# Patient Record
Sex: MALE | Race: WHITE | Employment: OTHER | URBAN - METROPOLITAN AREA
[De-identification: names, ages, dates, MRNs, and addresses within clinical notes are randomized per-mention and may not be internally consistent; named-entity substitution may affect disease eponyms.]

---

## 2017-02-08 ENCOUNTER — ANESTHESIA EVENT (OUTPATIENT)
Dept: ENDOSCOPY | Age: 82
End: 2017-02-08
Payer: MEDICARE

## 2017-02-08 RX ORDER — CHOLECALCIFEROL (VITAMIN D3) 125 MCG
100 CAPSULE ORAL DAILY
Status: ON HOLD | COMMUNITY
End: 2021-03-22

## 2017-02-08 RX ORDER — MELATONIN
2000 DAILY
Status: ON HOLD | COMMUNITY
End: 2021-03-22

## 2017-02-08 RX ORDER — BISMUTH SUBSALICYLATE 262 MG
1 TABLET,CHEWABLE ORAL DAILY
COMMUNITY

## 2017-02-09 ENCOUNTER — ANESTHESIA (OUTPATIENT)
Dept: ENDOSCOPY | Age: 82
End: 2017-02-09
Payer: MEDICARE

## 2017-02-09 ENCOUNTER — HOSPITAL ENCOUNTER (OUTPATIENT)
Age: 82
Setting detail: OUTPATIENT SURGERY
Discharge: HOME | End: 2017-02-09
Attending: INTERNAL MEDICINE | Admitting: INTERNAL MEDICINE
Payer: MEDICARE

## 2017-02-09 ENCOUNTER — SURGERY (OUTPATIENT)
Age: 82
End: 2017-02-09

## 2017-02-09 VITALS
DIASTOLIC BLOOD PRESSURE: 78 MMHG | WEIGHT: 173.31 LBS | SYSTOLIC BLOOD PRESSURE: 127 MMHG | RESPIRATION RATE: 21 BRPM | HEART RATE: 67 BPM | HEIGHT: 73 IN | OXYGEN SATURATION: 100 % | TEMPERATURE: 97.9 F | BODY MASS INDEX: 22.97 KG/M2

## 2017-02-09 PROCEDURE — 88342 IMHCHEM/IMCYTCHM 1ST ANTB: CPT | Performed by: INTERNAL MEDICINE

## 2017-02-09 PROCEDURE — 74011250636 HC RX REV CODE- 250/636: Performed by: INTERNAL MEDICINE

## 2017-02-09 PROCEDURE — 77030009426 HC FCPS BIOP ENDOSC BSC -B: Performed by: INTERNAL MEDICINE

## 2017-02-09 PROCEDURE — 74011000250 HC RX REV CODE- 250

## 2017-02-09 PROCEDURE — 77030013992 HC SNR POLYP ENDOSC BSC -B: Performed by: INTERNAL MEDICINE

## 2017-02-09 PROCEDURE — 88305 TISSUE EXAM BY PATHOLOGIST: CPT | Performed by: INTERNAL MEDICINE

## 2017-02-09 PROCEDURE — 76060000032 HC ANESTHESIA 0.5 TO 1 HR: Performed by: INTERNAL MEDICINE

## 2017-02-09 PROCEDURE — 74011250636 HC RX REV CODE- 250/636

## 2017-02-09 PROCEDURE — 76040000007: Performed by: INTERNAL MEDICINE

## 2017-02-09 RX ORDER — FENTANYL CITRATE 50 UG/ML
25 INJECTION, SOLUTION INTRAMUSCULAR; INTRAVENOUS
Status: DISCONTINUED | OUTPATIENT
Start: 2017-02-09 | End: 2017-02-09 | Stop reason: HOSPADM

## 2017-02-09 RX ORDER — PROPOFOL 10 MG/ML
INJECTION, EMULSION INTRAVENOUS AS NEEDED
Status: DISCONTINUED | OUTPATIENT
Start: 2017-02-09 | End: 2017-02-09 | Stop reason: HOSPADM

## 2017-02-09 RX ORDER — NALOXONE HYDROCHLORIDE 0.4 MG/ML
0.4 INJECTION, SOLUTION INTRAMUSCULAR; INTRAVENOUS; SUBCUTANEOUS
Status: DISCONTINUED | OUTPATIENT
Start: 2017-02-09 | End: 2017-02-09 | Stop reason: HOSPADM

## 2017-02-09 RX ORDER — ATROPINE SULFATE 0.1 MG/ML
0.5 INJECTION INTRAVENOUS
Status: DISCONTINUED | OUTPATIENT
Start: 2017-02-09 | End: 2017-02-09 | Stop reason: HOSPADM

## 2017-02-09 RX ORDER — SODIUM CHLORIDE 0.9 % (FLUSH) 0.9 %
5-10 SYRINGE (ML) INJECTION EVERY 8 HOURS
Status: CANCELLED | OUTPATIENT
Start: 2017-02-09 | End: 2017-02-09

## 2017-02-09 RX ORDER — SODIUM CHLORIDE 0.9 % (FLUSH) 0.9 %
5-10 SYRINGE (ML) INJECTION AS NEEDED
Status: CANCELLED | OUTPATIENT
Start: 2017-02-09 | End: 2017-02-09

## 2017-02-09 RX ORDER — SODIUM CHLORIDE 0.9 % (FLUSH) 0.9 %
5-10 SYRINGE (ML) INJECTION EVERY 8 HOURS
Status: DISCONTINUED | OUTPATIENT
Start: 2017-02-09 | End: 2017-02-09 | Stop reason: HOSPADM

## 2017-02-09 RX ORDER — SODIUM CHLORIDE 9 MG/ML
75 INJECTION, SOLUTION INTRAVENOUS CONTINUOUS
Status: DISCONTINUED | OUTPATIENT
Start: 2017-02-09 | End: 2017-02-09 | Stop reason: HOSPADM

## 2017-02-09 RX ORDER — FLUMAZENIL 0.1 MG/ML
0.2 INJECTION INTRAVENOUS
Status: CANCELLED | OUTPATIENT
Start: 2017-02-09 | End: 2017-02-09

## 2017-02-09 RX ORDER — NALOXONE HYDROCHLORIDE 0.4 MG/ML
0.4 INJECTION, SOLUTION INTRAMUSCULAR; INTRAVENOUS; SUBCUTANEOUS
Status: CANCELLED | OUTPATIENT
Start: 2017-02-09 | End: 2017-02-09

## 2017-02-09 RX ORDER — DEXTROMETHORPHAN/PSEUDOEPHED 2.5-7.5/.8
1.2 DROPS ORAL
Status: DISCONTINUED | OUTPATIENT
Start: 2017-02-09 | End: 2017-02-09 | Stop reason: HOSPADM

## 2017-02-09 RX ORDER — EPINEPHRINE 0.1 MG/ML
1 INJECTION INTRACARDIAC; INTRAVENOUS
Status: DISCONTINUED | OUTPATIENT
Start: 2017-02-09 | End: 2017-02-09 | Stop reason: HOSPADM

## 2017-02-09 RX ORDER — MIDAZOLAM HYDROCHLORIDE 1 MG/ML
.25-5 INJECTION, SOLUTION INTRAMUSCULAR; INTRAVENOUS
Status: DISCONTINUED | OUTPATIENT
Start: 2017-02-09 | End: 2017-02-09 | Stop reason: HOSPADM

## 2017-02-09 RX ORDER — FLUMAZENIL 0.1 MG/ML
0.2 INJECTION INTRAVENOUS
Status: DISCONTINUED | OUTPATIENT
Start: 2017-02-09 | End: 2017-02-09 | Stop reason: HOSPADM

## 2017-02-09 RX ORDER — FENTANYL CITRATE 50 UG/ML
50 INJECTION, SOLUTION INTRAMUSCULAR; INTRAVENOUS
Status: CANCELLED | OUTPATIENT
Start: 2017-02-09 | End: 2017-02-09

## 2017-02-09 RX ORDER — LIDOCAINE HYDROCHLORIDE 20 MG/ML
INJECTION, SOLUTION EPIDURAL; INFILTRATION; INTRACAUDAL; PERINEURAL AS NEEDED
Status: DISCONTINUED | OUTPATIENT
Start: 2017-02-09 | End: 2017-02-09 | Stop reason: HOSPADM

## 2017-02-09 RX ORDER — SODIUM CHLORIDE 0.9 % (FLUSH) 0.9 %
5-10 SYRINGE (ML) INJECTION AS NEEDED
Status: DISCONTINUED | OUTPATIENT
Start: 2017-02-09 | End: 2017-02-09 | Stop reason: HOSPADM

## 2017-02-09 RX ORDER — MIDAZOLAM HYDROCHLORIDE 1 MG/ML
.25-5 INJECTION, SOLUTION INTRAMUSCULAR; INTRAVENOUS
Status: CANCELLED | OUTPATIENT
Start: 2017-02-09 | End: 2017-02-09

## 2017-02-09 RX ADMIN — LIDOCAINE HYDROCHLORIDE 40 MG: 20 INJECTION, SOLUTION EPIDURAL; INFILTRATION; INTRACAUDAL; PERINEURAL at 10:30

## 2017-02-09 RX ADMIN — SODIUM CHLORIDE 75 ML/HR: 900 INJECTION, SOLUTION INTRAVENOUS at 09:49

## 2017-02-09 RX ADMIN — PROPOFOL 240 MG: 10 INJECTION, EMULSION INTRAVENOUS at 10:57

## 2017-02-09 NOTE — IP AVS SNAPSHOT
Current Discharge Medication List  
  
Take these medications at their scheduled times Dose & Instructions Dispensing Information Comments Morning Noon Evening Bedtime  
 atorvastatin 10 mg tablet Commonly known as:  LIPITOR Your next dose is: Today, Tomorrow Other:  ____________ Dose:  10 mg Take 1 Tab by mouth nightly. Quantity:  30 Tab Refills:  1 This is to control your cholesterol CO Q-10 100 mg capsule Generic drug:  co-enzyme Q-10 Your next dose is: Today, Tomorrow Other:  ____________ Dose:  100 mg Take 100 mg by mouth daily. Refills:  0  
     
   
   
   
  
 multivitamin tablet Commonly known as:  ONE A DAY Your next dose is: Today, Tomorrow Other:  ____________ Dose:  1 Tab Take 1 Tab by mouth daily. Refills:  0  
     
   
   
   
  
 VITAMIN D3 1,000 unit tablet Generic drug:  cholecalciferol Your next dose is: Today, Tomorrow Other:  ____________ Dose:  1000 Units Take 1,000 Units by mouth daily. Refills:  0 XARELTO 15 mg Tab tablet Generic drug:  rivaroxaban Your next dose is: Today, Tomorrow Other:  ____________ Dose:  15 mg Take 15 mg by mouth daily (with dinner). Refills:  0

## 2017-02-09 NOTE — IP AVS SNAPSHOT
[address] [facility] 
[phone] Patient: Pedro Metzger MRN: [id number] RNL:[id number] You are allergic to the following No active allergies Recent Documentation Height Weight BMI Smoking Status 1.854 m 78.6 kg 22.87 kg/m2 Former Smoker Emergency Contacts Name Discharge Info Relation Home Work Mobile Katharine Ibanez DISCHARGE CAREGIVER [3] Spouse [3] [phone] About your hospitalization You were admitted on:  February 9, 2017 You last received care in the:  [facility] You were discharged on:  February 9, 2017 Unit phone number:  [phone] Why you were hospitalized Your primary diagnosis was:  Not on File Providers Seen During Your Hospitalizations Provider Role Specialty Primary office phone Jimbo Lopez MD Attending Provider Gastroenterology [phone] Your Primary Care Physician (PCP) Primary Care Physician Office Phone Office Fax Lupillo Marie [phone] Follow-up Information None Your Appointments Monday February 20, 2017 10:30 AM EST  
CT CHEST WO CONT with ED [facility] CT 2 [facility] RAD CT ([address]) [address] [facility]  
[phone] NON-CONTRAST STUDY: 1. Bring any non [facility] facility films/images pertaining to the area of interest with you on the day of appointment. 2. Check in at registration at least 30 minutes before appt time unless you were instructed to do otherwise. 3. If you have to drink oral contrast please pick it up any weekday prior to your appointment, if you cannot please check in 2 hrs  before appt time. Patient should report to outpatient registration ([facility]) 30 minutes prior to the appointment time unless instructed otherwise. Current Discharge Medication List  
  
CONTINUE these medications which have NOT CHANGED Dose & Instructions Dispensing Information Comments Morning Noon Evening Bedtime  
 atorvastatin 10 mg tablet Commonly known as:  LIPITOR Your next dose is: Today, Tomorrow Other:  _________ Dose:  10 mg Take 1 Tab by mouth nightly. Quantity:  30 Tab Refills:  1 This is to control your cholesterol CO Q-10 100 mg capsule Generic drug:  co-enzyme Q-10 Your next dose is: Today, Tomorrow Other:  _________ Dose:  100 mg Take 100 mg by mouth daily. Refills:  0  
     
   
   
   
  
 multivitamin tablet Commonly known as:  ONE A DAY Your next dose is: Today, Tomorrow Other:  _________ Dose:  1 Tab Take 1 Tab by mouth daily. Refills:  0  
     
   
   
   
  
 VITAMIN D3 1,000 unit tablet Generic drug:  cholecalciferol Your next dose is: Today, Tomorrow Other:  _________ Dose:  1000 Units Take 1,000 Units by mouth daily. Refills:  0 XARELTO 15 mg Tab tablet Generic drug:  rivaroxaban Your next dose is: Today, Tomorrow Other:  _________ Dose:  15 mg Take 15 mg by mouth daily (with dinner). Refills:  0 Discharge Instructions Leticia Almendarez [id number] 
8/13/1931 EGD/COLON DISCHARGE INSTRUCTIONS Discomfort: 
Redness at IV site- apply warm compress to area; if redness or soreness persist- contact your physician There may be a slight amount of blood passed from the rectum Gaseous discomfort- walking, belching will help relieve any discomfort You may not operate a vehicle for 12 hours You may not engage in an occupation involving machinery or appliances for rest of today You may not drink alcoholic beverages for at least 12 hours Avoid making any critical decisions for at least 24 hour DIET: 
 High fiber diet.  however -  remember your colon is empty and a heavy meal will produce gas. Avoid these foods:  vegetables, fried / greasy foods, carbonated drinks for today MEDICATION: 
 
 
  
ACTIVITY: 
You may not resume your normal daily activities until tomorrow AM; it is recommended that you spend the remainder of the day resting -  avoid any strenuous activity. CALL M.D. ANY SIGN OF: Increasing pain, nausea, vomiting Abdominal distension (swelling) New increased bleeding (oral or rectal) Fever (chills) Pain in chest area Bloody discharge from nose or mouth Shortness of breath You may not  take any Advil, Aspirin, Ibuprofen, Motrin, Aleve, or Goodys for 10 days, ONLY  Tylenol as needed for pain. IMPRESSION: 
-Normal esophagus; biospied to exclude esophagitis 
-Normal stomach; biopsied to exclude gastritis 
-Normal duodenum  
-Normal terminal ileum 
-Sigmoid diverticulosis -Single diminutive, benign-appearing 2mm sessile polyp in sigmoid colon at 45cm; removed with cold snare 
-Single diminutive, benign-appearing 2mm sessile polyp in rectum at 20cm; removed with cold snare Follow-up Instructions: 
 Call Dr. Yoselyn Contreras for the results of procedure / biopsy in 7-10 days Telephone # [phone] You may resume Xarelto tomorrow No repeat colonoscopy indicated Jimbo Lopez MD 
 
Fliptop Activation Thank you for requesting access to Fliptop. Please follow the instructions below to securely access and download your online medical record. Fliptop allows you to send messages to your doctor, view your test results, renew your prescriptions, schedule appointments, and more. How Do I Sign Up? 1. In your internet browser, go to [email or web address] 
2. Click on the First Time User? Click Here link in the Sign In box. You will be redirect to the New Member Sign Up page. 3. Enter your Proteus Agility Access Code exactly as it appears below. You will not need to use this code after youve completed the sign-up process. If you do not sign up before the expiration date, you must request a new code. Invictus Marketingt Access Code: Activation code not generated Current Proteus Agility Status: Active (This is the date your Proteus Agility access code will ) 4. Enter the last four digits of your Social Security Number (xxxx) and Date of Birth (mm/dd/yyyy) as indicated and click Submit. You will be taken to the next sign-up page. 5. Create a Invictus Marketingt ID. This will be your Proteus Agility login ID and cannot be changed, so think of one that is secure and easy to remember. 6. Create a Proteus Agility password. You can change your password at any time. 7. Enter your Password Reset Question and Answer. This can be used at a later time if you forget your password. 8. Enter your e-mail address. You will receive e-mail notification when new information is available in [address] 9. Click Sign Up. You can now view and download portions of your medical record. 10. Click the Download Summary menu link to download a portable copy of your medical information. Additional Information If you have questions, please visit the Frequently Asked Questions section of the Proteus Agility website at [email or web address] Remember, Proteus Agility is NOT to be used for urgent needs. For medical emergencies, dial 911. Discharge Orders None Introducing [facility] SERVICES! Dear Leonel Florentino: Thank you for requesting a Proteus Agility account. Our records indicate that you already have an active Proteus Agility account. You can access your account anytime at [email or web address] Did you know that you can access your hospital and ER discharge instructions at any time in Proteus Agility? You can also review all of your test results from your hospital stay or ER visit. Additional Information If you have questions, please visit the Frequently Asked Questions section of the MyChart website at [email or web address] Remember, MyChart is NOT to be used for urgent needs. For medical emergencies, dial 911. Now available from your iPhone and Android! General Information Please provide this summary of care documentation to your next provider. Patient Signature:  ____________________________________________________________ Date:  ____________________________________________________________  
  
Yuri Shove Provider Signature:  ____________________________________________________________ Date:  ____________________________________________________________

## 2017-02-09 NOTE — ANESTHESIA POSTPROCEDURE EVALUATION
Post-Anesthesia Evaluation and Assessment    Patient: Lewis Magana MRN: [id number]  SSN: [id number]    YOB: 1931  Age: 80 y.o. Sex: male       Cardiovascular Function/Vital Signs  Visit Vitals    /78    Pulse 67    Temp 36.6 °C (97.9 °F)    Resp 21    Ht 6' 1\" (1.854 m)    Wt 78.6 kg (173 lb 5 oz)    SpO2 100%    BMI 22.87 kg/m2       Patient is status post total IV anesthesia anesthesia for Procedure(s):  COLONOSCOPY,EGD  ESOPHAGOGASTRODUODENOSCOPY (EGD)  ESOPHAGOGASTRODUODENAL (EGD) BIOPSY  ENDOSCOPIC POLYPECTOMY. Nausea/Vomiting: None    Postoperative hydration reviewed and adequate. Pain:  Pain Scale 1: Numeric (0 - 10) (02/09/17 1133)  Pain Intensity 1: 0 (02/09/17 1133)   Managed    Neurological Status: At baseline    Mental Status and Level of Consciousness: Arousable    Pulmonary Status:   O2 Device: Room air (02/09/17 1133)   Adequate oxygenation and airway patent    Complications related to anesthesia: None    Post-anesthesia assessment completed.  No concerns    Signed By: Marianne Charles DO     February 9, 2017

## 2017-02-09 NOTE — DISCHARGE INSTRUCTIONS
Melinda Rascon  [id number]  8/13/1931    EGD/COLON DISCHARGE INSTRUCTIONS  Discomfort:  Redness at IV site- apply warm compress to area; if redness or soreness persist- contact your physician  There may be a slight amount of blood passed from the rectum  Gaseous discomfort- walking, belching will help relieve any discomfort  You may not operate a vehicle for 12 hours  You may not engage in an occupation involving machinery or appliances for rest of today  You may not drink alcoholic beverages for at least 12 hours  Avoid making any critical decisions for at least 24 hour  DIET:   High fiber diet. - however -  remember your colon is empty and a heavy meal will produce gas. Avoid these foods:  vegetables, fried / greasy foods, carbonated drinks for today  MEDICATION:         ACTIVITY:  You may not resume your normal daily activities until tomorrow AM; it is recommended that you spend the remainder of the day resting -  avoid any strenuous activity. CALL M.D. ANY SIGN OF:   Increasing pain, nausea, vomiting  Abdominal distension (swelling)  New increased bleeding (oral or rectal)  Fever (chills)  Pain in chest area  Bloody discharge from nose or mouth  Shortness of breath    You may not  take any Advil, Aspirin, Ibuprofen, Motrin, Aleve, or Goodys for 10 days, ONLY  Tylenol as needed for pain.     IMPRESSION:  -Normal esophagus; biospied to exclude esophagitis  -Normal stomach; biopsied to exclude gastritis  -Normal duodenum   -Normal terminal ileum  -Sigmoid diverticulosis  -Single diminutive, benign-appearing 2mm sessile polyp in sigmoid colon at 45cm; removed with cold snare  -Single diminutive, benign-appearing 2mm sessile polyp in rectum at 20cm; removed with cold snare    Follow-up Instructions:   Call Dr. Rhonda Sharma for the results of procedure / biopsy in 7-10 days  Telephone # [phone]  You may resume Xarelto tomorrow  No repeat colonoscopy indicated    Ar Rosa MD    MyChart Activation    Thank you for requesting access to Dinetouch. Please follow the instructions below to securely access and download your online medical record. Dinetouch allows you to send messages to your doctor, view your test results, renew your prescriptions, schedule appointments, and more. How Do I Sign Up? 1. In your internet browser, go to [email or web address]  2. Click on the First Time User? Click Here link in the Sign In box. You will be redirect to the New Member Sign Up page. 3. Enter your Dinetouch Access Code exactly as it appears below. You will not need to use this code after youve completed the sign-up process. If you do not sign up before the expiration date, you must request a new code. Dinetouch Access Code: Activation code not generated  Current Dinetouch Status: Active (This is the date your Dinetouch access code will )    4. Enter the last four digits of your Social Security Number (xxxx) and Date of Birth (mm/dd/yyyy) as indicated and click Submit. You will be taken to the next sign-up page. 5. Create a Dinetouch ID. This will be your Dinetouch login ID and cannot be changed, so think of one that is secure and easy to remember. 6. Create a Dinetouch password. You can change your password at any time. 7. Enter your Password Reset Question and Answer. This can be used at a later time if you forget your password. 8. Enter your e-mail address. You will receive e-mail notification when new information is available in [address] 9. Click Sign Up. You can now view and download portions of your medical record. 10. Click the Download Summary menu link to download a portable copy of your medical information. Additional Information    If you have questions, please visit the Frequently Asked Questions section of the Dinetouch website at [email or web address] Remember, Dinetouch is NOT to be used for urgent needs. For medical emergencies, dial 911.

## 2017-02-09 NOTE — PERIOP NOTES
Anesthesia reports 240mg Propofol, 40mg Lidocaine and 900mL NS given during procedure. Received report from anesthesia staff on vital signs and status of patient.

## 2017-02-09 NOTE — PROCEDURES
NAME:  Sunshine Hendricks   :   1931   MRN:   [id number]     Date/Time:  2017 11:10 AM    Colonoscopy Operative Report    Procedure Type:   Colonoscopy with polypectomy (cold snare)     Indications:     Iron deficiency anemia, Occult blood in stool  Pre-operative Diagnosis: see indication above  Post-operative Diagnosis:  See findings below  :  Joy Uribe MD  Referring Provider: Gena Mays MD;-Russell Rouse MD    Exam:  Airway: clear, no airway problems anticipated  Heart: RRR, without gallops or rubs  Lungs: clear bilaterally without wheezes, crackles, or rhonchi  Abdomen: soft, nontender, nondistended, bowel sounds present  Mental Status: awake, alert and oriented to person, place and time    Sedation:  MAC anesthesia Propofol 220mg IV  Procedure Details:  After informed consent was obtained with all risks and benefits of procedure explained and preoperative exam completed, the patient was taken to the endoscopy suite and placed in the left lateral decubitus position. Upon sequential sedation as per above, a digital rectal exam was performed demonstrating no hemorrhoids. The Olympus videocolonoscope  was inserted in the rectum and carefully advanced to the cecum, which was identified by the ileocecal valve and appendiceal orifice. The distal terminal ileum was evaluated. The quality of preparation was adequate. The colonoscope was slowly withdrawn with careful evaluation between folds. Retroflexion in the rectum was completed demonstrating no hemorrhoids. Findings:     -Normal terminal ileum  -Sigmoid diverticulosis  -Single diminutive, benign-appearing 2mm sessile polyp in sigmoid colon at 45cm; removed with cold snare  -Single diminutive, benign-appearing 2mm sessile polyp in rectum at 20cm; removed with cold snare    Specimen Removed:  #3 sigmoid polyp; #4 rectum polyp  Complications: None. EBL:  None.     Impression:    -Normal terminal ileum  -Sigmoid diverticulosis  -Single diminutive, benign-appearing 2mm sessile polyp in sigmoid colon at 45cm; removed with cold snare  -Single diminutive, benign-appearing 2mm sessile polyp in rectum at 20cm; removed with cold snare    Recommendations: --Await pathology. , -Follow up with primary care physician. , -Resume Xarelto tomorrow. High fiber diet. Resume normal medication(s). You will receive a letter about the biopsy results in about 10 days. You may be asked to call your doctor's office for the results. Discharge Disposition:  Home in the company of a  when able to ambulate.       Tashi Rosado MD

## 2017-02-09 NOTE — ANESTHESIA PREPROCEDURE EVALUATION
Anesthetic History               Review of Systems / Medical History  Patient summary reviewed, nursing notes reviewed and pertinent labs reviewed    Pulmonary          Smoker (EX, 40 pk yr, quit 2-1998)         Neuro/Psych       CVA (2012)  TIA     Cardiovascular            Dysrhythmias : atrial flutter  Pacemaker and hyperlipidemia         GI/Hepatic/Renal               Comments: Fe defic anemia, Heme pos stools Endo/Other        Anemia (fe defic anemia)     Other Findings            Physical Exam    Airway  Mallampati: III  TM Distance: 4 - 6 cm  Neck ROM: normal range of motion   Mouth opening: Normal     Cardiovascular  Regular rate and rhythm,  S1 and S2 normal,  no murmur, click, rub, or gallop             Dental  No notable dental hx  Dentition: Bridges     Pulmonary  Breath sounds clear to auscultation               Abdominal  GI exam deferred       Other Findings            Anesthetic Plan    ASA: 2  Anesthesia type: general and total IV anesthesia          Induction: Intravenous  Anesthetic plan and risks discussed with: Patient

## 2017-02-09 NOTE — ROUTINE PROCESS
Salbador [facility]  8/13/1931  [id number]    Situation:  Verbal report received from: Génesis Lynch Rn  Procedure: Procedure(s):  COLONOSCOPY,EGD  ESOPHAGOGASTRODUODENOSCOPY (EGD)  ESOPHAGOGASTRODUODENAL (EGD) BIOPSY  ENDOSCOPIC POLYPECTOMY    Background:    Preoperative diagnosis: IRON DEF ANEMIA, GUAIAC POSITIVE STOOL, LONG TERM USE ANTICOAG  Postoperative diagnosis: egd- esophagitis  colon- diverticulosis, polyp    :  Dr. Sera Colmenares  Assistant(s): Endoscopy Technician-1: Shin Lowe  Endoscopy RN-1: Anabelle Underwood RN    Specimens:   ID Type Source Tests Collected by Time Destination   1 : stomach biopsy Preservative   Hieu Weir MD 2/9/2017 1033 Pathology   2 : distal esophagus biopsy Preservative Esophagus, Distal  Hieu Weir MD 2/9/2017 1037 Pathology   3 : sigmoid polyp Preservative Sigmoid  Hieu Weir MD 2/9/2017 1048 Pathology   4 : rectal polyp Preservative Rectum  Hieu Weir MD 2/9/2017 1059 Pathology     H. Pylori  no      Assessment:  Intra-procedure medications     Anesthesia gave intra-procedure sedation and medications, see anesthesia flow sheet     Intravenous fluids: NS@ KVO     Vital signs stable     Abdominal assessment: round and soft     Recommendation:  Discharge patient per MD order.     Family or Friend   Permission to share finding with family or friend yes

## 2017-02-09 NOTE — PROCEDURES
NAME:  Sunshine Hendricks   :   1931   MRN:   [id number]     Date/Time:  2017 11:06 AM    Esophagogastroduodenoscopy (EGD) Procedure Note    Procedure: Esophagogastroduodenoscopy with biopsy    Indication:  Iron deficiency anemia, Occult blood in stool with possible UGI origin  Pre-operative Diagnosis: see indication above  Post-operative Diagnosis: see findings below  :  Joy Uribe MD  Referring Provider:   Gena Mays MD;Joelle Rouse MD    Exam:  Airway: clear, no airway problems anticipated  Heart: RRR, without gallops or rubs  Lungs: clear bilaterally without wheezes, crackles, or rhonchi  Abdomen: soft, nontender, nondistended, bowel sounds present  Mental Status: awake, alert and oriented to person, place and time     Anethesia/Sedation:  MAC anesthesia Propofol as per colonoscopy   Procedure Details   After informed consent was obtained for the procedure, with all risks and benefits of procedure explained the patient was taken to the endoscopy suite and placed in the left lateral decubitus position. Following sequential administration of sedation as per above, the ADOQ504 gastroscope was inserted into the mouth and advanced under direct vision to second portion of the duodenum. A careful inspection was made as the gastroscope was withdrawn, including a retroflexed view of the proximal stomach; findings and interventions are described below. Findings:    -Normal esophagus; biospied to exclude esophagitis  -Normal stomach; biopsied to exclude gastritis  -Normal duodenum     Therapies:  biopsy of esophagus; biopsy of stomach   Specimens: #1 gastric; #2 distal esophagus    EBL:  None. Complications:   None; patient tolerated the procedure well. Impression:    -Normal esophagus; biospied to exclude esophagitis  -Normal stomach; biopsied to exclude gastritis  -Normal duodenum     Recommendations:  -Await pathology.     Discharge disposition:  Home in the company of  when able to ambulate after colonsocopy    Thomas Lo MD

## 2017-02-09 NOTE — H&P
Gastroenterology Outpatient History and Physical    Patient: Robi Prudent    Physician: Alpa Loyd MD    Chief Complaint: Fe def anemia  History of Present Illness: 81yo M with Fe def anemia and reported guaiac positive stool in setting of Xarelto use. Xarelto held day before and after procedure planned    History:  Past Medical History   Diagnosis Date    Ill-defined condition      elevated cholesterol    Stroke ([address]      2012      Past Surgical History   Procedure Laterality Date    Hx orthopaedic       left TKR    Pr abdomen surgery proc unlisted       hernia repair    Hx appendectomy      Hx pacemaker       nov 3 2016 pacemaker/left upper chest/Dr.George Morales    Hx cataract removal       bilateral      Social History     Social History    Marital status:      Spouse name: N/A    Number of children: N/A    Years of education: N/A     Social History Main Topics    Smoking status: Former Smoker     Packs/day: 2.00     Years: 20.00     Quit date: 2/8/1998    Smokeless tobacco: Never Used    Alcohol use No    Drug use: No    Sexual activity: Not Asked     Other Topics Concern    None     Social History Narrative      Family History   Problem Relation Age of Onset    Heart Disease Mother     Diabetes Mother     Cancer Mother     Heart Disease Father     Lung Disease Father     Cancer Brother     Heart Disease Brother     Psychiatric Disorder Brother     Hypertension Brother     Elevated Lipids Brother     Psychiatric Disorder Sister     Hypertension Sister       Patient Active Problem List   Diagnosis Code    TIA (transient ischemic attack) G45.9    Atrial flutter (HCC) I48.92       Allergies: No Known Allergies  Medications:   Prior to Admission medications    Medication Sig Start Date End Date Taking? Authorizing Provider   rivaroxaban (XARELTO) 15 mg tab tablet Take 15 mg by mouth daily (with dinner).    Yes Historical Provider   multivitamin (ONE A DAY) tablet Take 1 Tab by mouth daily. Yes Historical Provider   cholecalciferol (VITAMIN D3) 1,000 unit tablet Take 1,000 Units by mouth daily. Yes Historical Provider   co-enzyme Q-10 (CO Q-10) 100 mg capsule Take 100 mg by mouth daily. Yes Historical Provider   atorvastatin (LIPITOR) 10 mg tablet Take 1 Tab by mouth nightly. 12/30/12  Yes Kayden Lopez MD     Physical Exam:   Vital Signs: Blood pressure 135/86, pulse 71, temperature 98 °F (36.7 °C), resp. rate 19, height 6' 1\" (1.854 m), weight 78.6 kg (173 lb 5 oz), SpO2 97 %.   General: well developed, well nourished   HEENT: unremarkable   Heart: regular rhythm no mumur    Lungs: clear   Abdominal:  benign   Neurological: unremarkable   Extremities: no edema     Findings/Diagnosis: Fe def anemia  Plan of Care/Planned Procedure: EGd/Colonoscopy with conscious/deep sedation    Signed:  Efrain Rouse MD 2/9/2017

## 2017-02-20 ENCOUNTER — HOSPITAL ENCOUNTER (OUTPATIENT)
Dept: CT IMAGING | Age: 82
Discharge: HOME | End: 2017-02-20
Attending: INTERNAL MEDICINE
Payer: MEDICARE

## 2017-02-20 DIAGNOSIS — J61 ASBESTOSIS (HCC): ICD-10-CM

## 2017-02-20 PROCEDURE — 71250 CT THORAX DX C-: CPT

## 2018-12-16 ENCOUNTER — APPOINTMENT (OUTPATIENT)
Dept: CT IMAGING | Age: 83
End: 2018-12-16
Attending: EMERGENCY MEDICINE
Payer: MEDICARE

## 2018-12-16 ENCOUNTER — HOSPITAL ENCOUNTER (EMERGENCY)
Age: 83
Discharge: HOME | End: 2018-12-16
Attending: EMERGENCY MEDICINE | Admitting: EMERGENCY MEDICINE
Payer: MEDICARE

## 2018-12-16 VITALS
HEIGHT: 73 IN | RESPIRATION RATE: 17 BRPM | WEIGHT: 167.55 LBS | HEART RATE: 66 BPM | TEMPERATURE: 97.3 F | BODY MASS INDEX: 22.21 KG/M2 | SYSTOLIC BLOOD PRESSURE: 138 MMHG | DIASTOLIC BLOOD PRESSURE: 68 MMHG | OXYGEN SATURATION: 99 %

## 2018-12-16 DIAGNOSIS — S09.90XA CLOSED HEAD INJURY, INITIAL ENCOUNTER: Primary | ICD-10-CM

## 2018-12-16 DIAGNOSIS — T14.8XXA MULTIPLE SKIN TEARS: ICD-10-CM

## 2018-12-16 PROCEDURE — 90471 IMMUNIZATION ADMIN: CPT

## 2018-12-16 PROCEDURE — 99282 EMERGENCY DEPT VISIT SF MDM: CPT

## 2018-12-16 PROCEDURE — 90715 TDAP VACCINE 7 YRS/> IM: CPT | Performed by: EMERGENCY MEDICINE

## 2018-12-16 PROCEDURE — 74011250636 HC RX REV CODE- 250/636: Performed by: EMERGENCY MEDICINE

## 2018-12-16 PROCEDURE — 70450 CT HEAD/BRAIN W/O DYE: CPT

## 2018-12-16 RX ADMIN — TETANUS TOXOID, REDUCED DIPHTHERIA TOXOID AND ACELLULAR PERTUSSIS VACCINE, ADSORBED 0.5 ML: 5; 2.5; 8; 8; 2.5 SUSPENSION INTRAMUSCULAR at 04:54

## 2018-12-16 NOTE — ED TRIAGE NOTES
Pt states he slipped off of his bed and hit his head and cut behind his rt ear. Wife states he is on xarelto. Pt denies any loss of consciousness.

## 2018-12-16 NOTE — DISCHARGE INSTRUCTIONS
Learning About a Closed Head Injury  What is a closed head injury? A closed head injury happens when your head gets hit hard. The strong force of the blow causes your brain to shake in your skull. This movement can cause the brain to bruise, swell, or tear. Sometimes nerves or blood vessels also get damaged. This can cause bleeding in or around the brain. A concussion is a type of closed head injury. What are the symptoms? If you have a mild concussion, you may have a mild headache or feel \"not quite right. \" These symptoms are common. They usually go away over a few days to 4 weeks. But sometimes after a concussion, you feel like you can't function as well as before the injury. And you have new symptoms. This is called postconcussive syndrome. You may:  · Find it harder to solve problems, think, concentrate, or remember. · Have headaches. · Have changes in your sleep patterns, such as not being able to sleep or sleeping all the time. · Have changes in your personality. · Not be interested in your usual activities. · Feel angry or anxious without a clear reason. · Lose your sense of taste or smell. · Be dizzy, lightheaded, or unsteady. It may be hard to stand or walk. How is a closed head injury treated? Any person who may have a concussion needs to see a doctor. Some people have to stay in the hospital to be watched. Others can go home safely. If you go home, follow your doctor's instructions. He or she will tell you if you need someone to watch you closely for the next 24 hours or longer. Rest is the best treatment. Get plenty of sleep at night. And try to rest during the day. · Avoid activities that are physically or mentally demanding. These include housework, exercise, and schoolwork. And don't play video games, send text messages, or use the computer. You may need to change your school or work schedule to be able to avoid these activities.   · Ask your doctor when it's okay to drive, ride a bike, or operate machinery. · Take an over-the-counter pain medicine, such as acetaminophen (Tylenol), ibuprofen (Advil, Motrin), or naproxen (Aleve). Be safe with medicines. Read and follow all instructions on the label. · Check with your doctor before you use any other medicines for pain. · Do not drink alcohol or use illegal drugs. They can slow recovery. They can also increase your risk of getting a second head injury. Follow-up care is a key part of your treatment and safety. Be sure to make and go to all appointments, and call your doctor if you are having problems. It's also a good idea to know your test results and keep a list of the medicines you take. Where can you learn more? Go to http://nae-gene.info/. Enter E235 in the search box to learn more about \"Learning About a Closed Head Injury. \"  Current as of: June 4, 2018  Content Version: 11.8  © 5003-2085 [employer]. Care instructions adapted under license by [employer] (which disclaims liability or warranty for this information). If you have questions about a medical condition or this instruction, always ask your healthcare professional. [address] any warranty or liability for your use of this information.

## 2018-12-16 NOTE — ED PROVIDER NOTES
EMERGENCY DEPARTMENT HISTORY AND PHYSICAL EXAM    Date: 12/16/2018  Patient Name: Aldo Hillman    History of Presenting Illness     Chief Complaint   Patient presents with    Fall    Laceration       History Provided By: Patient and Patient's Wife    HPI: Aldo Hillman is a 80 y.o. male, pmhx Stroke, HCL, who presents ambulatory accompanied by his wife to the ED c/o abrasion behond the right ear s/p a fall x PTA. Pt states he was attempting to get out of bed, he did not \"realize how close to the edge\" he was. Pt reports accidentally sliding off the bed and hitting the right side of his head on a nearby nightstand. He notes he is currently on Xarelto. Pt denies any LOC. Pt specifically denies any neck pain, fever, congestion, cough, shortness of breath, chest pain, leg pain, leg swelling, abdominal pain, nausea, vomiting, diarrhea, dysuria, or urinary frequency. PCP: Laura Marcelino MD    PMHx: Significant for Stroke, HCL  PSHx: Significant for Orthopedic, Hernia repair, appendectomy, Pacemaker placement, Colonoscopy  Social Hx: -tobacco (former), -EtOH, -Illicit Drugs     There are no other complaints, changes, or physical findings at this time. Current Outpatient Medications   Medication Sig Dispense Refill    rivaroxaban (XARELTO) 15 mg tab tablet Take 15 mg by mouth daily (with dinner).  multivitamin (ONE A DAY) tablet Take 1 Tab by mouth daily.  cholecalciferol (VITAMIN D3) 1,000 unit tablet Take 1,000 Units by mouth daily.  co-enzyme Q-10 (CO Q-10) 100 mg capsule Take 100 mg by mouth daily.  atorvastatin (LIPITOR) 10 mg tablet Take 1 Tab by mouth nightly.  30 Tab 1       Past History     Past Medical History:  Past Medical History:   Diagnosis Date    Ill-defined condition     elevated cholesterol    Stroke ([address]     2012       Past Surgical History:  Past Surgical History:   Procedure Laterality Date    ABDOMEN SURGERY PROC UNLISTED      hernia repair    COLONOSCOPY N/A 2017    COLONOSCOPY,EGD performed by Dina Bryson MD at [address] [place] [address]  2017         HX APPENDECTOMY      HX CATARACT REMOVAL      bilateral    HX ORTHOPAEDIC      left TKR    HX PACEMAKER      nov 3 2016 pacemaker/left upper chest/Dr.George Morales    UPPER GI ENDOSCOPY,BIOPSY  2017            Family History:  Family History   Problem Relation Age of Onset    Heart Disease Mother     Diabetes Mother     Cancer Mother     Heart Disease Father     Lung Disease Father     Cancer Brother     Heart Disease Brother     Psychiatric Disorder Brother     Hypertension Brother     Elevated Lipids Brother     Psychiatric Disorder Sister     Hypertension Sister        Social History:  Social History     Tobacco Use    Smoking status: Former Smoker     Packs/day: 2.00     Years: 20.00     Pack years: 40.00     Last attempt to quit: 1998     Years since quittin.8    Smokeless tobacco: Never Used   Substance Use Topics    Alcohol use: No    Drug use: No       Allergies:  No Known Allergies      Review of Systems   Review of Systems   Constitutional: Negative for chills and fever. HENT: Negative. Eyes: Negative. Respiratory: Negative for cough, chest tightness and shortness of breath. Cardiovascular: Negative for chest pain and leg swelling. Gastrointestinal: Negative for abdominal pain, diarrhea, nausea and vomiting. Endocrine: Negative. Genitourinary: Negative for difficulty urinating and dysuria. Musculoskeletal: Negative for myalgias. Skin: Positive for wound. Neurological: Negative. Psychiatric/Behavioral: Negative. All other systems reviewed and are negative. Physical Exam   Physical Exam   Constitutional: He is oriented to person, place, and time. He appears well-developed and well-nourished. No distress. HENT:   Head: Normocephalic. Head is with abrasion, with contusion and with laceration.        Nose: Nose normal. Mouth/Throat: No oropharyngeal exudate. Eyes: Conjunctivae and EOM are normal. Pupils are equal, round, and reactive to light. Neck: Normal range of motion. Neck supple. No JVD present. Cardiovascular: Normal rate, regular rhythm, normal heart sounds and intact distal pulses. Exam reveals no friction rub. No murmur heard. Pulmonary/Chest: Effort normal and breath sounds normal. No stridor. No respiratory distress. He has no wheezes. He has no rales. Abdominal: Soft. Bowel sounds are normal. He exhibits no distension. There is no tenderness. There is no rebound. Musculoskeletal: Normal range of motion. He exhibits no tenderness. Neurological: He is alert and oriented to person, place, and time. No cranial nerve deficit. He exhibits normal muscle tone. Coordination normal.   Skin: Skin is warm and dry. Laceration (skin tear to posterior R ear) noted. No rash noted. He is not diaphoretic. Psychiatric: He has a normal mood and affect. His speech is normal and behavior is normal. Judgment and thought content normal. Cognition and memory are normal.   Nursing note and vitals reviewed. Diagnostic Study Results     Labs -   No results found for this or any previous visit (from the past 12 hour(s)). Radiologic Studies -   CT HEAD WO CONT   Final Result   IMPRESSION: No acute intracranial process seen              CT Results  (Last 48 hours)               12/16/18 0531  CT HEAD WO CONT Final result    Impression:  IMPRESSION: No acute intracranial process seen               Narrative:  EXAM: CT HEAD WO CONT       INDICATION: fall, on xarelto. Laceration next to the right ear. COMPARISON: 2012. CONTRAST: None. TECHNIQUE: Unenhanced CT of the head was performed using 5 mm images. Brain and   bone windows were generated. CT dose reduction was achieved through use of a   standardized protocol tailored for this examination and automatic exposure   control for dose modulation. FINDINGS:   The ventricles and sulci are normal in size, shape and configuration and   midline. There is mild periventricular white matter hypodensity. There is no   intracranial hemorrhage, extra-axial collection, mass, mass effect or midline   shift. The basilar cisterns are open. No acute infarct is identified. The bone   windows demonstrate no abnormalities. The visualized portions of the paranasal   sinuses and mastoid air cells are clear. CXR Results  (Last 48 hours)    None            Medical Decision Making   I am the first provider for this patient. I reviewed the vital signs, available nursing notes, past medical history, past surgical history, family history and social history. Vital Signs-Reviewed the patient's vital signs. Patient Vitals for the past 12 hrs:   Temp Pulse Resp BP SpO2   12/16/18 0412 97.3 °F (36.3 °C) 66 17 138/68 99 %       Pulse Oximetry Analysis - 99% on RA    Cardiac Monitor:   Rate: 66 bpm  Rhythm: Normal Sinus Rhythm      Records Reviewed: Nursing Notes, Old Medical Records, Previous electrocardiograms, Previous Radiology Studies and Previous Laboratory Studies    Provider Notes (Medical Decision Making):     DDX:  Skin tears, ich, contusions    Plan:  Head ct, wound cleaning, tdap booster    Impression:  Skin tears, closed head injury    ED Course:   Initial assessment performed. The patients presenting problems have been discussed, and they are in agreement with the care plan formulated and outlined with them. I have encouraged them to ask questions as they arise throughout their visit. I reviewed our electronic medical record system for any past medical records that were available that may contribute to the patients current condition, the nursing notes and and vital signs from today's visit    Nursing notes will be reviewed as they become available in realtime while the pt has been in the ED.   Atilio Dukes MD    I personally reviewed pt's imaging. Official read by radiology listed above. Aneta Goodell, MD      5:49 AM  Progress note:  Pt noted to be feeling better, ready for discharge. Discussed lab and imaging findings with pt and/or family, specifically noting negative head ct. Pt will follow up as instructed. All questions have been answered, pt voiced understanding and agreement with plan. If narcotics were prescribed, pt's  record was reviewed and pt was advised not to drive or operate heavy machinery. If abx were prescribed, pt advised that diarrhea and rash are possible side effects of the medications. Specific return precautions provided in addition to instructions for pt to return to the ED immediately should sx worsen at any time. Aneta Goodell, MD      Critical Care Time:     none    PLAN:  1. Current Discharge Medication List        2. Follow-up Information     Follow up With Specialties Details Why Contact Info    Rosa Cao MD Family Practice Schedule an appointment as soon as possible for a visit in 2 days  [place] [phone]          Return to ED if worse     Disposition:    5:50 AM   The patient's results have been reviewed with family and/or caregiver. They verbally convey their understanding and agreement of the patient's signs, symptoms, diagnosis, treatment and prognosis and additionally agree to follow up as recommended in the discharge instructions or to return to the Emergency Room should the patient's condition change prior to their follow-up appointment. The family and/or caregiver verbally agrees with the care-plan and all of their questions have been answered. The discharge instructions have also been provided to the them with educational information regarding the patient's diagnosis as well a list of reasons why the patient would want to return to the ER prior to their follow-up appointment should their condition change.   Aneta Goodell, MD              Diagnosis     Clinical Impression:   1. Closed head injury, initial encounter    2. Multiple skin tears        Attestations: This note is prepared by John Champion, acting as Scribe for MD Louise Corona MD : The scribe's documentation has been prepared under my direction and personally reviewed by me in its entirety. I confirm that the note above accurately reflects all work, treatment, procedures, and medical decision making performed by me. This note will not be viewable in [address]

## 2021-03-21 ENCOUNTER — APPOINTMENT (OUTPATIENT)
Dept: NON INVASIVE DIAGNOSTICS | Age: 86
DRG: 189 | End: 2021-03-21
Attending: INTERNAL MEDICINE
Payer: MEDICARE

## 2021-03-21 ENCOUNTER — APPOINTMENT (OUTPATIENT)
Dept: GENERAL RADIOLOGY | Age: 86
DRG: 189 | End: 2021-03-21
Attending: EMERGENCY MEDICINE
Payer: MEDICARE

## 2021-03-21 ENCOUNTER — HOSPITAL ENCOUNTER (INPATIENT)
Age: 86
LOS: 5 days | Discharge: HOME HEALTH | DRG: 189 | End: 2021-03-26
Attending: EMERGENCY MEDICINE | Admitting: INTERNAL MEDICINE
Payer: MEDICARE

## 2021-03-21 DIAGNOSIS — R77.8 ELEVATED TROPONIN: ICD-10-CM

## 2021-03-21 DIAGNOSIS — D64.9 ANEMIA, UNSPECIFIED TYPE: ICD-10-CM

## 2021-03-21 DIAGNOSIS — J96.01 ACUTE RESPIRATORY FAILURE WITH HYPOXIA (HCC): Primary | ICD-10-CM

## 2021-03-21 LAB
ALBUMIN SERPL-MCNC: 2.8 G/DL (ref 3.5–5)
ALBUMIN/GLOB SERPL: 0.6 {RATIO} (ref 1.1–2.2)
ALP SERPL-CCNC: 213 U/L (ref 45–117)
ALT SERPL-CCNC: 19 U/L (ref 12–78)
ANION GAP SERPL CALC-SCNC: 8 MMOL/L (ref 5–15)
APTT PPP: 28.8 SEC (ref 22.1–31)
ARTERIAL PATENCY WRIST A: ABNORMAL
AST SERPL-CCNC: 36 U/L (ref 15–37)
B PERT DNA SPEC QL NAA+PROBE: NOT DETECTED
BASE EXCESS BLD CALC-SCNC: 5 MMOL/L
BASOPHILS # BLD: 0 K/UL (ref 0–0.1)
BASOPHILS NFR BLD: 0 % (ref 0–1)
BDY SITE: ABNORMAL
BILIRUB SERPL-MCNC: 0.7 MG/DL (ref 0.2–1)
BNP SERPL-MCNC: ABNORMAL PG/ML
BORDETELLA PARAPERTUSSIS PCR, BORPAR: NOT DETECTED
BUN SERPL-MCNC: 45 MG/DL (ref 6–20)
BUN/CREAT SERPL: 24 (ref 12–20)
C PNEUM DNA SPEC QL NAA+PROBE: NOT DETECTED
CA-I BLD-SCNC: 1.05 MMOL/L (ref 1.12–1.32)
CALCIUM SERPL-MCNC: 8.1 MG/DL (ref 8.5–10.1)
CHLORIDE SERPL-SCNC: 108 MMOL/L (ref 97–108)
CO2 SERPL-SCNC: 26 MMOL/L (ref 21–32)
COVID-19 RAPID TEST, COVR: NOT DETECTED
CREAT SERPL-MCNC: 1.87 MG/DL (ref 0.7–1.3)
DIFFERENTIAL METHOD BLD: ABNORMAL
ECHO AO ROOT DIAM: 3.39 CM
ECHO AV AREA PEAK VELOCITY: 1.21 CM2
ECHO AV AREA VTI: 1.06 CM2
ECHO AV AREA/BSA VTI: 0.5 CM2/M2
ECHO AV MEAN GRADIENT: 9.35 MMHG
ECHO AV PEAK GRADIENT: 15.3 MMHG
ECHO AV PEAK GRADIENT: 15.42 MMHG
ECHO AV PEAK VELOCITY: 194.77 CM/S
ECHO AV PEAK VELOCITY: 195.49 CM/S
ECHO AV VTI: 31.98 CM
ECHO EST RA PRESSURE: 8 MMHG
ECHO LA MAJOR AXIS: 4.35 CM
ECHO LA MINOR AXIS: 2.14 CM
ECHO LV INTERNAL DIMENSION DIASTOLIC: 4.62 CM (ref 4.2–5.9)
ECHO LV INTERNAL DIMENSION SYSTOLIC: 3.31 CM
ECHO LV IVSD: 1.22 CM (ref 0.6–1)
ECHO LV IVSS: 1.64 CM
ECHO LV MASS 2D: 212.6 G (ref 88–224)
ECHO LV MASS INDEX 2D: 104.6 G/M2 (ref 49–115)
ECHO LV POSTERIOR WALL DIASTOLIC: 1.23 CM (ref 0.6–1)
ECHO LV POSTERIOR WALL SYSTOLIC: 1.83 CM
ECHO LVOT CARDIAC OUTPUT: 2.77 LITER/MINUTE
ECHO LVOT DIAM: 2.08 CM
ECHO LVOT PEAK GRADIENT: 2.01 MMHG
ECHO LVOT PEAK GRADIENT: 2.06 MMHG
ECHO LVOT PEAK VELOCITY: 69.17 CM/S
ECHO LVOT PEAK VELOCITY: 69.38 CM/S
ECHO LVOT SV: 33.8 ML
ECHO LVOT VTI: 9.93 CM
ECHO MV AREA PHT: 5.21 CM2
ECHO MV E DECELERATION TIME (DT): 100.47 MS
ECHO MV E VELOCITY: 62.95 CM/S
ECHO MV PRESSURE HALF TIME (PHT): 42.21 MS
ECHO PV MAX VELOCITY: 96.72 CM/S
ECHO PV MEAN GRADIENT: 1.37 MMHG
ECHO PV PEAK INSTANTANEOUS GRADIENT SYSTOLIC: 2.66 MMHG
ECHO PV PEAK INSTANTANEOUS GRADIENT SYSTOLIC: 3.74 MMHG
ECHO PV REGURGITANT END DIASTOLIC MAX VELOCITY: 132.08 CM/S
ECHO PV REGURGITANT MAX VELOCITY: 81.6 CM/S
ECHO PV VTI: 12.06 CM
ECHO RIGHT VENTRICULAR SYSTOLIC PRESSURE (RVSP): 36.53 MMHG
ECHO RV INTERNAL DIMENSION: 3.58 CM
ECHO TV REGURGITANT MAX VELOCITY: 266.24 CM/S
ECHO TV REGURGITANT PEAK GRADIENT: 28.53 MMHG
EOSINOPHIL # BLD: 0 K/UL (ref 0–0.4)
EOSINOPHIL NFR BLD: 0 % (ref 0–7)
ERYTHROCYTE [DISTWIDTH] IN BLOOD BY AUTOMATED COUNT: 16.2 % (ref 11.5–14.5)
FERRITIN SERPL-MCNC: 95 NG/ML (ref 26–388)
FLUAV AG NPH QL IA: NEGATIVE
FLUAV H1 2009 PAND RNA SPEC QL NAA+PROBE: NOT DETECTED
FLUAV H1 RNA SPEC QL NAA+PROBE: NOT DETECTED
FLUAV H3 RNA SPEC QL NAA+PROBE: NOT DETECTED
FLUAV SUBTYP SPEC NAA+PROBE: NOT DETECTED
FLUBV AG NOSE QL IA: NEGATIVE
FLUBV RNA SPEC QL NAA+PROBE: NOT DETECTED
GAS FLOW.O2 O2 DELIVERY SYS: ABNORMAL L/MIN
GLOBULIN SER CALC-MCNC: 4.8 G/DL (ref 2–4)
GLUCOSE SERPL-MCNC: 99 MG/DL (ref 65–100)
HADV DNA SPEC QL NAA+PROBE: NOT DETECTED
HCO3 BLD-SCNC: 27.4 MMOL/L (ref 22–26)
HCOV 229E RNA SPEC QL NAA+PROBE: NOT DETECTED
HCOV HKU1 RNA SPEC QL NAA+PROBE: NOT DETECTED
HCOV NL63 RNA SPEC QL NAA+PROBE: NOT DETECTED
HCOV OC43 RNA SPEC QL NAA+PROBE: NOT DETECTED
HCT VFR BLD AUTO: 23.1 % (ref 36.6–50.3)
HEMOCCULT STL QL: POSITIVE
HGB BLD-MCNC: 7.1 G/DL (ref 12.1–17)
HISTORY CHECKED?,CKHIST: NORMAL
HMPV RNA SPEC QL NAA+PROBE: NOT DETECTED
HPIV1 RNA SPEC QL NAA+PROBE: NOT DETECTED
HPIV2 RNA SPEC QL NAA+PROBE: NOT DETECTED
HPIV3 RNA SPEC QL NAA+PROBE: NOT DETECTED
HPIV4 RNA SPEC QL NAA+PROBE: NOT DETECTED
IMM GRANULOCYTES # BLD AUTO: 0.1 K/UL (ref 0–0.04)
IMM GRANULOCYTES NFR BLD AUTO: 1 % (ref 0–0.5)
INR PPP: 1.5 (ref 0.9–1.1)
IRON SATN MFR SERPL: 10 % (ref 20–50)
IRON SERPL-MCNC: 21 UG/DL (ref 35–150)
LACTATE BLD-SCNC: 1.23 MMOL/L (ref 0.4–2)
LACTATE SERPL-SCNC: 2 MMOL/L (ref 0.4–2)
LVOT MG: 0.92 MMHG
LYMPHOCYTES # BLD: 1 K/UL (ref 0.8–3.5)
LYMPHOCYTES NFR BLD: 12 % (ref 12–49)
M PNEUMO DNA SPEC QL NAA+PROBE: NOT DETECTED
MAGNESIUM SERPL-MCNC: 2.6 MG/DL (ref 1.6–2.4)
MCH RBC QN AUTO: 29.8 PG (ref 26–34)
MCHC RBC AUTO-ENTMCNC: 30.7 G/DL (ref 30–36.5)
MCV RBC AUTO: 97.1 FL (ref 80–99)
MONOCYTES # BLD: 0.7 K/UL (ref 0–1)
MONOCYTES NFR BLD: 8 % (ref 5–13)
NEUTS SEG # BLD: 6.8 K/UL (ref 1.8–8)
NEUTS SEG NFR BLD: 79 % (ref 32–75)
NRBC # BLD: 0.06 K/UL (ref 0–0.01)
NRBC BLD-RTO: 0.7 PER 100 WBC
PCO2 BLD: 33.7 MMHG (ref 35–45)
PH BLD: 7.52 [PH] (ref 7.35–7.45)
PLATELET # BLD AUTO: 194 K/UL (ref 150–400)
PMV BLD AUTO: 9.7 FL (ref 8.9–12.9)
PO2 BLD: 36 MMHG (ref 80–100)
POTASSIUM SERPL-SCNC: 3.1 MMOL/L (ref 3.5–5.1)
PROCALCITONIN SERPL-MCNC: 0.92 NG/ML
PROT SERPL-MCNC: 7.6 G/DL (ref 6.4–8.2)
PROTHROMBIN TIME: 15.4 SEC (ref 9–11.1)
RBC # BLD AUTO: 2.38 M/UL (ref 4.1–5.7)
RSV RNA SPEC QL NAA+PROBE: NOT DETECTED
RV+EV RNA SPEC QL NAA+PROBE: NOT DETECTED
SAO2 % BLD: 76 % (ref 92–97)
SARS-COV-2 PCR, COVPCR: NOT DETECTED
SARS-COV-2, COV2: NORMAL
SODIUM SERPL-SCNC: 142 MMOL/L (ref 136–145)
SOURCE, COVRS: NORMAL
SPECIMEN TYPE: ABNORMAL
THERAPEUTIC RANGE,PTTT: NORMAL SECS (ref 58–77)
TIBC SERPL-MCNC: 211 UG/DL (ref 250–450)
TROPONIN I SERPL-MCNC: 0.14 NG/ML
TROPONIN I SERPL-MCNC: 0.24 NG/ML
TROPONIN I SERPL-MCNC: 0.29 NG/ML
WBC # BLD AUTO: 8.6 K/UL (ref 4.1–11.1)

## 2021-03-21 PROCEDURE — 83735 ASSAY OF MAGNESIUM: CPT

## 2021-03-21 PROCEDURE — 71045 X-RAY EXAM CHEST 1 VIEW: CPT

## 2021-03-21 PROCEDURE — 74011250636 HC RX REV CODE- 250/636: Performed by: INTERNAL MEDICINE

## 2021-03-21 PROCEDURE — 86923 COMPATIBILITY TEST ELECTRIC: CPT

## 2021-03-21 PROCEDURE — 77030040361 HC SLV COMPR DVT MDII -B

## 2021-03-21 PROCEDURE — 83540 ASSAY OF IRON: CPT

## 2021-03-21 PROCEDURE — 74011250637 HC RX REV CODE- 250/637: Performed by: INTERNAL MEDICINE

## 2021-03-21 PROCEDURE — 74011000250 HC RX REV CODE- 250: Performed by: EMERGENCY MEDICINE

## 2021-03-21 PROCEDURE — 74011250636 HC RX REV CODE- 250/636: Performed by: EMERGENCY MEDICINE

## 2021-03-21 PROCEDURE — 83605 ASSAY OF LACTIC ACID: CPT

## 2021-03-21 PROCEDURE — 85730 THROMBOPLASTIN TIME PARTIAL: CPT

## 2021-03-21 PROCEDURE — 87040 BLOOD CULTURE FOR BACTERIA: CPT

## 2021-03-21 PROCEDURE — 0202U NFCT DS 22 TRGT SARS-COV-2: CPT

## 2021-03-21 PROCEDURE — 36415 COLL VENOUS BLD VENIPUNCTURE: CPT

## 2021-03-21 PROCEDURE — 84484 ASSAY OF TROPONIN QUANT: CPT

## 2021-03-21 PROCEDURE — 93306 TTE W/DOPPLER COMPLETE: CPT

## 2021-03-21 PROCEDURE — 93005 ELECTROCARDIOGRAM TRACING: CPT

## 2021-03-21 PROCEDURE — 99285 EMERGENCY DEPT VISIT HI MDM: CPT

## 2021-03-21 PROCEDURE — 94660 CPAP INITIATION&MGMT: CPT

## 2021-03-21 PROCEDURE — 85610 PROTHROMBIN TIME: CPT

## 2021-03-21 PROCEDURE — 84145 PROCALCITONIN (PCT): CPT

## 2021-03-21 PROCEDURE — 82803 BLOOD GASES ANY COMBINATION: CPT

## 2021-03-21 PROCEDURE — 82728 ASSAY OF FERRITIN: CPT

## 2021-03-21 PROCEDURE — 65660000001 HC RM ICU INTERMED STEPDOWN

## 2021-03-21 PROCEDURE — 36430 TRANSFUSION BLD/BLD COMPNT: CPT

## 2021-03-21 PROCEDURE — 85025 COMPLETE CBC W/AUTO DIFF WBC: CPT

## 2021-03-21 PROCEDURE — 87635 SARS-COV-2 COVID-19 AMP PRB: CPT

## 2021-03-21 PROCEDURE — C9113 INJ PANTOPRAZOLE SODIUM, VIA: HCPCS | Performed by: EMERGENCY MEDICINE

## 2021-03-21 PROCEDURE — 74011000258 HC RX REV CODE- 258: Performed by: INTERNAL MEDICINE

## 2021-03-21 PROCEDURE — 74011000250 HC RX REV CODE- 250: Performed by: INTERNAL MEDICINE

## 2021-03-21 PROCEDURE — 87804 INFLUENZA ASSAY W/OPTIC: CPT

## 2021-03-21 PROCEDURE — 83880 ASSAY OF NATRIURETIC PEPTIDE: CPT

## 2021-03-21 PROCEDURE — 30233N1 TRANSFUSION OF NONAUTOLOGOUS RED BLOOD CELLS INTO PERIPHERAL VEIN, PERCUTANEOUS APPROACH: ICD-10-PCS | Performed by: SPECIALIST

## 2021-03-21 PROCEDURE — C9113 INJ PANTOPRAZOLE SODIUM, VIA: HCPCS | Performed by: INTERNAL MEDICINE

## 2021-03-21 PROCEDURE — 80053 COMPREHEN METABOLIC PANEL: CPT

## 2021-03-21 PROCEDURE — P9016 RBC LEUKOCYTES REDUCED: HCPCS

## 2021-03-21 PROCEDURE — 5A09357 ASSISTANCE WITH RESPIRATORY VENTILATION, LESS THAN 24 CONSECUTIVE HOURS, CONTINUOUS POSITIVE AIRWAY PRESSURE: ICD-10-PCS | Performed by: EMERGENCY MEDICINE

## 2021-03-21 PROCEDURE — 86900 BLOOD TYPING SEROLOGIC ABO: CPT

## 2021-03-21 PROCEDURE — 82272 OCCULT BLD FECES 1-3 TESTS: CPT

## 2021-03-21 RX ORDER — ATORVASTATIN CALCIUM 10 MG/1
10 TABLET, FILM COATED ORAL
Status: DISCONTINUED | OUTPATIENT
Start: 2021-03-21 | End: 2021-03-22

## 2021-03-21 RX ORDER — SODIUM CHLORIDE 0.9 % (FLUSH) 0.9 %
5-40 SYRINGE (ML) INJECTION EVERY 8 HOURS
Status: DISCONTINUED | OUTPATIENT
Start: 2021-03-21 | End: 2021-03-26 | Stop reason: HOSPADM

## 2021-03-21 RX ORDER — MIDODRINE HYDROCHLORIDE 5 MG/1
10 TABLET ORAL
Status: DISCONTINUED | OUTPATIENT
Start: 2021-03-21 | End: 2021-03-22

## 2021-03-21 RX ORDER — PANTOPRAZOLE SODIUM 40 MG/10ML
40 INJECTION, POWDER, LYOPHILIZED, FOR SOLUTION INTRAVENOUS EVERY 12 HOURS
Status: DISCONTINUED | OUTPATIENT
Start: 2021-03-21 | End: 2021-03-21 | Stop reason: SDUPTHER

## 2021-03-21 RX ORDER — POLYETHYLENE GLYCOL 3350 17 G/17G
17 POWDER, FOR SOLUTION ORAL DAILY PRN
Status: DISCONTINUED | OUTPATIENT
Start: 2021-03-21 | End: 2021-03-26 | Stop reason: HOSPADM

## 2021-03-21 RX ORDER — PROMETHAZINE HYDROCHLORIDE 25 MG/1
12.5 TABLET ORAL
Status: DISCONTINUED | OUTPATIENT
Start: 2021-03-21 | End: 2021-03-26 | Stop reason: HOSPADM

## 2021-03-21 RX ORDER — ACETAMINOPHEN 325 MG/1
650 TABLET ORAL
Status: DISCONTINUED | OUTPATIENT
Start: 2021-03-21 | End: 2021-03-25 | Stop reason: SDUPTHER

## 2021-03-21 RX ORDER — MIDODRINE HYDROCHLORIDE 10 MG/1
10 TABLET ORAL
COMMUNITY

## 2021-03-21 RX ORDER — GUAIFENESIN 100 MG/5ML
325 LIQUID (ML) ORAL
Status: DISCONTINUED | OUTPATIENT
Start: 2021-03-21 | End: 2021-03-21

## 2021-03-21 RX ORDER — ONDANSETRON 2 MG/ML
4 INJECTION INTRAMUSCULAR; INTRAVENOUS
Status: DISCONTINUED | OUTPATIENT
Start: 2021-03-21 | End: 2021-03-26 | Stop reason: HOSPADM

## 2021-03-21 RX ORDER — ACETAMINOPHEN 650 MG/1
650 SUPPOSITORY RECTAL
Status: DISCONTINUED | OUTPATIENT
Start: 2021-03-21 | End: 2021-03-26 | Stop reason: HOSPADM

## 2021-03-21 RX ORDER — SODIUM CHLORIDE 0.9 % (FLUSH) 0.9 %
5-40 SYRINGE (ML) INJECTION AS NEEDED
Status: DISCONTINUED | OUTPATIENT
Start: 2021-03-21 | End: 2021-03-26 | Stop reason: HOSPADM

## 2021-03-21 RX ORDER — ACETAMINOPHEN 325 MG/1
650 TABLET ORAL
Status: DISCONTINUED | OUTPATIENT
Start: 2021-03-21 | End: 2021-03-26 | Stop reason: HOSPADM

## 2021-03-21 RX ORDER — FUROSEMIDE 10 MG/ML
40 INJECTION INTRAMUSCULAR; INTRAVENOUS 2 TIMES DAILY
Status: DISCONTINUED | OUTPATIENT
Start: 2021-03-21 | End: 2021-03-25

## 2021-03-21 RX ORDER — FUROSEMIDE 10 MG/ML
40 INJECTION INTRAMUSCULAR; INTRAVENOUS 2 TIMES DAILY
Status: DISCONTINUED | OUTPATIENT
Start: 2021-03-21 | End: 2021-03-21

## 2021-03-21 RX ORDER — SODIUM CHLORIDE 9 MG/ML
250 INJECTION, SOLUTION INTRAVENOUS AS NEEDED
Status: DISPENSED | OUTPATIENT
Start: 2021-03-21 | End: 2021-03-22

## 2021-03-21 RX ADMIN — AZITHROMYCIN MONOHYDRATE 500 MG: 500 INJECTION, POWDER, LYOPHILIZED, FOR SOLUTION INTRAVENOUS at 10:13

## 2021-03-21 RX ADMIN — MIDODRINE HYDROCHLORIDE 10 MG: 5 TABLET ORAL at 12:48

## 2021-03-21 RX ADMIN — FUROSEMIDE 40 MG: 10 INJECTION, SOLUTION INTRAMUSCULAR; INTRAVENOUS at 14:54

## 2021-03-21 RX ADMIN — CEFTRIAXONE 1 G: 1 INJECTION, POWDER, FOR SOLUTION INTRAMUSCULAR; INTRAVENOUS at 09:36

## 2021-03-21 RX ADMIN — Medication 10 ML: at 21:31

## 2021-03-21 RX ADMIN — ATORVASTATIN CALCIUM 10 MG: 10 TABLET, FILM COATED ORAL at 21:29

## 2021-03-21 RX ADMIN — SODIUM CHLORIDE 40 MG: 9 INJECTION, SOLUTION INTRAMUSCULAR; INTRAVENOUS; SUBCUTANEOUS at 09:31

## 2021-03-21 RX ADMIN — Medication 10 ML: at 18:48

## 2021-03-21 RX ADMIN — FUROSEMIDE 40 MG: 10 INJECTION, SOLUTION INTRAMUSCULAR; INTRAVENOUS at 18:46

## 2021-03-21 RX ADMIN — SODIUM CHLORIDE 40 MG: 9 INJECTION, SOLUTION INTRAMUSCULAR; INTRAVENOUS; SUBCUTANEOUS at 21:29

## 2021-03-21 RX ADMIN — MIDODRINE HYDROCHLORIDE 10 MG: 5 TABLET ORAL at 18:45

## 2021-03-21 NOTE — ED PROVIDER NOTES
EMERGENCY DEPARTMENT HISTORY AND PHYSICAL EXAM      Date: 3/21/2021  Patient Name: Fany Mullen    History of Presenting Illness     Chief Complaint   Patient presents with    Respiratory Distress     Found by EMS with O2 sats in 60s on room air, placed on bipap, low hgb       History Provided By: Patient    HPI: Fany Mullen, 80 y.o. male with PMHx as noted below presents the emergency department as a transfer from [facility] and emergency department with respiratory failure and pneumonia. Per report, patient been experiencing increasing dyspnea and cough for the last several days. This morning symptoms acutely worsen so EMS was called. On arrival was found to be hypoxic with oxygen saturations in the 60s. Was placed on CPAP by EMS, given an inch of Nitropaste and 40 of Lasix and transferred to the emergency department a new cannot. Symptoms worse with exertion. Denying any pain at this time. History is limited secondary to acuity of condition and patient being on BiPAP. Cardiologist: Dr. Kentrell Steiner  GI: Not currently following with a gastroenterologist for his wife but has seen Dr. Ann Alberts in the hospital in the past    PCP: David Mejia MD    Current Facility-Administered Medications   Medication Dose Route Frequency Provider Last Rate Last Admin    0.9% sodium chloride infusion 250 mL  250 mL IntraVENous PRN Kath June MD        acetaminophen (TYLENOL) tablet 650 mg  650 mg Oral Q6H PRN Kath June MD         Current Outpatient Medications   Medication Sig Dispense Refill    rivaroxaban (XARELTO) 15 mg tab tablet Take 15 mg by mouth daily (with dinner).  multivitamin (ONE A DAY) tablet Take 1 Tab by mouth daily.  cholecalciferol (VITAMIN D3) 1,000 unit tablet Take 1,000 Units by mouth daily.  co-enzyme Q-10 (CO Q-10) 100 mg capsule Take 100 mg by mouth daily.  atorvastatin (LIPITOR) 10 mg tablet Take 1 Tab by mouth nightly.  30 Tab 1       Past History     Past Medical History:  Past Medical History:   Diagnosis Date    Ill-defined condition     elevated cholesterol    Stroke [facility])            Past Surgical History:  Past Surgical History:   Procedure Laterality Date    COLONOSCOPY N/A 2017    COLONOSCOPY,EGD performed by Nando Ingram MD at  [facility]  2017         HX APPENDECTOMY      HX CATARACT REMOVAL      bilateral    HX ORTHOPAEDIC      left TKR    HX PACEMAKER      nov 3 2016 pacemaker/left upper chest/Dr.George Morales    TX ABDOMEN SURGERY PROC UNLISTED      hernia repair    UPPER GI ENDOSCOPY,BIOPSY  2017            Family History:  Family History   Problem Relation Age of Onset    Heart Disease Mother     Diabetes Mother     Cancer Mother     Heart Disease Father     Lung Disease Father     Cancer Brother     Heart Disease Brother     Psychiatric Disorder Brother     Hypertension Brother     Elevated Lipids Brother     Psychiatric Disorder Sister     Hypertension Sister        Social History:  Social History     Tobacco Use    Smoking status: Former Smoker     Packs/day: 2.00     Years: 20.00     Pack years: 40.00     Quit date: 1998     Years since quittin.1    Smokeless tobacco: Never Used   Substance Use Topics    Alcohol use: No    Drug use: No       Allergies:  No Known Allergies      Review of Systems   Review of Systems   Unable to perform ROS: Acuity of condition       Physical Exam   Physical Exam    GENERAL: alert and oriented, mild distress  EYES: PEERL, No injection, discharge or icterus. ENT: Mucous membranes pink and moist.  NECK: Supple  LUNGS: Airway patent. Labored respirations, coarse breath sounds bilaterally. HEART: Regular rate . No peripheral edema  ABDOMEN: Non-distended and non-tender, without guarding or rebound.   SKIN:  warm, dry  EXTREMITIES: Without  tenderness or deformity, symmetric with normal ROM  NEUROLOGICAL: Alert, follows commands, moving all 4 extremities. Diagnostic Study Results     Labs -     Recent Results (from the past 12 hour(s))   POC LACTIC ACID    Collection Time: 03/21/21  7:38 AM   Result Value Ref Range    Lactic Acid (POC) 1.23 0.40 - 2.00 mmol/L   POC EG7    Collection Time: 03/21/21  7:42 AM   Result Value Ref Range    Calcium, ionized (POC) 1.05 (L) 1.12 - 1.32 mmol/L    pH (POC) 7.52 (H) 7.35 - 7.45      pCO2 (POC) 33.7 (L) 35.0 - 45.0 MMHG    pO2 (POC) 36 (LL) 80 - 100 MMHG    HCO3 (POC) 27.4 (H) 22 - 26 MMOL/L    Base excess (POC) 5 mmol/L    sO2 (POC) 76 (L) 92 - 97 %    Site OTHER      Device: BIPAP      Allens test (POC) N/A      Specimen type (POC) VENOUS BLOOD         Radiologic Studies -   XR CHEST PORT    (Results Pending)     CT Results  (Last 48 hours)    None        CXR Results  (Last 48 hours)    None            Medical Decision Making     ICrystal MD am the first provider for this patient and am the attending of record for this patient encounter. I reviewed the vital signs, available nursing notes, past medical history, past surgical history, family history and social history. Vital Signs-Reviewed the patient's vital signs. Patient Vitals for the past 12 hrs:   Temp Pulse Resp BP SpO2   03/21/21 0732 99 °F (37.2 °C) 70 28 (!) 154/88 90 %         Pulse Oximetry Analysis - 90% on BiPAP  30% FiO2    Cardiac Monitor:   Rate: 70 bpm  Rhythm: Normal Sinus Rhythm    The cardiac monitor was ordered secondary to hypoxia  and to monitor the patient for dysrhythmia    Cardiac Monitor:   Rate: 73 bpm  Rhythm: Normal Sinus Rhythm   The cardiac monitor was ordered secondary to hypoxia  and to monitor the patient for dysrhythmia        Records Reviewed: Nursing Notes and Old Medical Records    Provider Notes (Medical Decision Making):   71-year-old male presented to the emergency department in transfer from outside facility with respiratory failure on CPAP.   Differential was broad and included pneumonia, COVID-19, influenza, heart failure, ACS, anemia, metabolic abnormalities among others. X-ray at outside facility reviewed showed patchy bilateral infiltrates, was treated with azithromycin and Rocephin. Presentation may be multifactorial with current differential of pneumonia versus heart failure, anemia likely contributing factor to his symptoms. We will transfuse 1 unit of packed red blood cells with a hemoglobin of 6.9. Hemoccult stool will be sent however is on Xarelto and has had GI bleed in the past so we will treat with Protonix. Troponin also elevated outside facility however given his anemia will hold off on any antiplatelet or anticoagulant medication as it may be demand ischemia secondary to his hypoxia. Will admit for further management. ED Course:   Initial assessment performed. The patients presenting problems have been discussed, and they are in agreement with the care plan formulated and outlined with them. I have encouraged them to ask questions as they arise throughout their visit. PROGRESS:  The patient has been re-evaluated and sx have improved. Reviewed available results with patient and have counseled them on diagnosis and care plan. They have expressed understanding, and all their questions have been answered. They agree with plan for admission. CONSULT:  Fern Hickey MD spoke with the hospitalist.  Discussed HPI and PE, available diagnostic tests and clinical findings. He is in agreement with care plans as outlined and will evaluate for admission     Admit Note  Patient is being admitted to the hospitalist.  The results of their tests and reasons for their admission have been discussed with them and/or available family. They convey agreement and understanding for the need to be admitted and for their admission diagnosis. Consultation has been made with the inpatient physician specialist for hospitalization.     Critical Care Note   IMPENDING DETERIORATION -Respiratory, Cardiovascular and CNS  ASSOCIATED RISK FACTORS - Hypoxia  MANAGEMENT- Bedside Assessment and Supervision of Care  INTERPRETATION -  Xrays, Blood Gases, ECG and Blood Pressure  INTERVENTIONS -BiPAP management  CASE REVIEW - Hospitalist/Intensivist  TREATMENT RESPONSE -Improved  PERFORMED BY - Self    NOTES   :  I have provided a total of 42 minutes of critical time not including time spent on separately documented procedures. The reason for providing this level of medical care for this critically ill patient was due to a critical illness that impaired one or more vital organ systems such that there was a high probability of imminent or life threatening deterioration in the patients condition. This care involved high complexity decision making to assess, manipulate, and support vital system functions,  lab review, consultations with specialist, family decision- making, bedside attention and documentation. During this entire length of time I was immediately available to the patient    Disposition:  admission    PLAN:  1. Admit     Diagnosis     Clinical Impression:   1. Acute respiratory failure with hypoxia ([address]    2. Anemia, unspecified type    3. Elevated troponin        Please note that this dictation was completed with Dragon, computer voice recognition software. Quite often unanticipated grammatical, syntax, homophones, and other interpretive errors are inadvertently transcribed by the computer software. Please disregard these errors. Additionally, please excuse any errors that have escaped final proofreading.

## 2021-03-21 NOTE — H&P
Hospitalist Admission Note    NAME: Shlomo Conroy   :  1931   MRN:  [id number]     Date/Time:  3/21/2021 8:39 AM    Patient PCP: Zeus Beckett MD  ________________________________________________________________________    My assessment of this patient's clinical condition and my plan of care is as follows. Assessment / Plan:  Acute hypoxic respiratory failure POA  Suspect acute systolic CHF/ PNA  History of A. fib, on Xarelto  History of SSS, has a pacemaker  Elevated troponin  Covid-19 rule out      -2 weeks history of shortness of breath/cough, hypoxic today at 60s at [facility] placed on CPAP, received lasix/NTG en route. -Transferred from [facility] ED,   -ProBNP 30k, Trop 0.25->0.29, Creat 1.87  -CXR with B/L Pulm opacities  -Symptoms likely worsened from anemia, hb 6.9 at OSH, here 7.1  -agree with 1 unit PRBC ordered by ED  -admit to step down  -Received Lasix earlier today, will place on lasix 40 BID, strict I's and O's, daily weight  -Echocardiogram, get cardiology evaluation  -Trend troponin, ? Demand ischemia  -Patient follows Dr. Talon Rosas    -Azithromycin and Rocephin, get procalcitonin, repeat chest x-ray tomorrow  -Low suspicion for bacterial pneumonia, rule out COVID-19, check rapid Covid and PCR    -Hold Xarelto    Acute on chronic anemia  -Hemoglobin 7.1, family reports, patient has history of GI bleed, but no recent overt GI symptoms.   ED no melena on exam.   -Last hemoglobin in our system is from 2016, which was 7.6  -Last dose of Xarelto yesterday hold for now  -Getting 1 unit of blood  -Watch for any signs of blood loss, check iron panel  -Patient follows Dr. Joleen Baltazar  -We will ask GI opinion    Elevated creatinine, 1.87  Unsure of what the baseline  Repeat BMP tomorrow, avoid nephrotoxic medication    History of HLD  History of TIA      Code Status: DNR  Surrogate Decision Maker: Wife    DVT Prophylaxis: SCD  GI Prophylaxis: not indicated    Baseline: Ambulatory Subjective:   CHIEF COMPLAINT: SOB    HISTORY OF PRESENT ILLNESS:     Savi Morgan is a 80 y.o.  male with a past medical history of sick sinus syndrome has a pacemaker, A. fib, hyperlipidemia, TIA, history of GI bleed he is a transfer from [facility] ED. As per the patient, chart, patient has been experiencing increased dyspnea and cough for past several days. And this morning he acutely got short of breath, when EMS arrived he was saturating in 60%, was placed on CPAP by EMS, he was given Nitropaste and 40 of Lasix, and transferred over to [facility] ER. Over there he was placed on BiPAP, chest x-ray showed bilateral patchy opacities, troponin was 0.25, normal white count, creatinine 1.7. Elevated proBNP. He was given azithromycin and Rocephin over there and was transferred here in the ED for the higher level of care. -Patient seen in the ED here, currently on BiPAP, initially 100% FiO2, which was weaned down to 30% FiO2. Is currently on BiPAP, reports he feels better. No chest pain. Reports his back is bothering him which is chronic. Denies any recent weight gain    We were asked to admit for work up and evaluation of the above problems.      Past Medical History:   Diagnosis Date    Ill-defined condition     elevated cholesterol    Stroke [facility])     2012        Past Surgical History:   Procedure Laterality Date    COLONOSCOPY N/A 2/9/2017    COLONOSCOPY,EGD performed by Jesus Lee MD at  [facility]  2/9/2017         HX APPENDECTOMY      HX CATARACT REMOVAL      bilateral    HX ORTHOPAEDIC      left TKR    HX PACEMAKER      nov 3 2016 pacemaker/left upper chest/Dr.George Morales    MS ABDOMEN SURGERY PROC UNLISTED      hernia repair    UPPER GI ENDOSCOPY,BIOPSY  2/9/2017            Social History     Tobacco Use    Smoking status: Former Smoker     Packs/day: 2.00     Years: 20.00     Pack years: 40.00     Quit date: 2/8/1998     Years since quittin.1    Smokeless tobacco: Never Used   Substance Use Topics    Alcohol use: No        Family History   Problem Relation Age of Onset    Heart Disease Mother     Diabetes Mother     Cancer Mother     Heart Disease Father     Lung Disease Father     Cancer Brother     Heart Disease Brother     Psychiatric Disorder Brother     Hypertension Brother     Elevated Lipids Brother     Psychiatric Disorder Sister     Hypertension Sister      No Known Allergies     Prior to Admission medications    Medication Sig Start Date End Date Taking? Authorizing Provider   rivaroxaban (XARELTO) 15 mg tab tablet Take 15 mg by mouth daily (with dinner). Provider, Historical   multivitamin (ONE A DAY) tablet Take 1 Tab by mouth daily. Provider, Historical   cholecalciferol (VITAMIN D3) 1,000 unit tablet Take 1,000 Units by mouth daily. Provider, Historical   co-enzyme Q-10 (CO Q-10) 100 mg capsule Take 100 mg by mouth daily. Provider, Historical   atorvastatin (LIPITOR) 10 mg tablet Take 1 Tab by mouth nightly. 12   Celina Perez MD       REVIEW OF SYSTEMS:     I am not able to complete the review of systems because:    The patient is intubated and sedated    The patient has altered mental status due to his acute medical problems    The patient has baseline aphasia from prior stroke(s)    The patient has baseline dementia and is not reliable historian    The patient is in acute medical distress and unable to provide information   x On bipap       Total of 12 systems reviewed as follows:       POSITIVE= underlined text  Negative = text not underlined  General:  fever, chills, sweats, generalized weakness, weight loss/gain,      loss of appetite   Eyes:    blurred vision, eye pain, loss of vision, double vision  ENT:    rhinorrhea, pharyngitis   Respiratory:   cough, sputum production, SOB, OSEGUERA, wheezing, pleuritic pain   Cardiology:   chest pain, palpitations, orthopnea, PND, edema, syncope   Gastrointestinal:  abdominal pain , N/V, diarrhea, dysphagia, constipation, bleeding   Genitourinary:  frequency, urgency, dysuria, hematuria, incontinence   Muskuloskeletal :  arthralgia, myalgia, back pain  Hematology:  easy bruising, nose or gum bleeding, lymphadenopathy   Dermatological: rash, ulceration, pruritis, color change / jaundice  Endocrine:   hot flashes or polydipsia   Neurological:  headache, dizziness, confusion, focal weakness, paresthesia,     Speech difficulties, memory loss, gait difficulty  Psychological: Feelings of anxiety, depression, agitation    Objective:   VITALS:    Visit Vitals  BP (!) 144/82   Pulse 75   Temp 99 °F (37.2 °C)   Resp 24   Ht 6' 1\" (1.854 m)   Wt 79.4 kg (175 lb)   SpO2 96%   BMI 23.09 kg/m²       PHYSICAL EXAM:    General:    Alert, cooperative, no distress, appears stated age. HEENT: Atraumatic, anicteric sclerae, pink conjunctivae  Neck:  Supple, symmetrical,  thyroid: non tender  Lungs:   Coarse sounds b/l  Chest wall:  No tenderness  No Accessory muscle use. Heart:   Regular  rhythm,  No  murmur   No edema  Abdomen:   Soft, non-tender. Not distended. Bowel sounds normal  Extremities: No cyanosis. No clubbing    Skin:     Not pale. Not Jaundiced  No rashes   Psych:  Good insight. Not depressed. Not anxious or agitated. Neurologic: EOMs intact. No facial asymmetry. No aphasia or slurred speech. Symmetrical strength, Sensation grossly intact.  Alert and oriented X 4.     _______________________________________________________________________  Care Plan discussed with:    Comments   Patient x    Family  x    RN x    Care Manager                    Consultant:      _______________________________________________________________________  Expected  Disposition:   Home with Family x   HH/PT/OT/RN    SNF/LTC    FELIX    ________________________________________________________________________  TOTAL TIME:  24 Minutes    Critical Care Provided     Minutes non procedure based      Comments   >50% of visit spent in counseling and coordination of care  Chart review  Discussion with patient and/or family and questions answered     ________________________________________________________________________  Jerson Duron MD    Procedures: see electronic medical records for all procedures/Xrays and details which were not copied into this note but were reviewed prior to creation of Plan. LAB DATA REVIEWED:    Recent Results (from the past 24 hour(s))   POC LACTIC ACID    Collection Time: 03/21/21  7:38 AM   Result Value Ref Range    Lactic Acid (POC) 1.23 0.40 - 2.00 mmol/L   POC EG7    Collection Time: 03/21/21  7:42 AM   Result Value Ref Range    Calcium, ionized (POC) 1.05 (L) 1.12 - 1.32 mmol/L    pH (POC) 7.52 (H) 7.35 - 7.45      pCO2 (POC) 33.7 (L) 35.0 - 45.0 MMHG    pO2 (POC) 36 (LL) 80 - 100 MMHG    HCO3 (POC) 27.4 (H) 22 - 26 MMOL/L    Base excess (POC) 5 mmol/L    sO2 (POC) 76 (L) 92 - 97 %    Site OTHER      Device: BIPAP      Allens test (POC) N/A      Specimen type (POC) VENOUS BLOOD     CBC WITH AUTOMATED DIFF    Collection Time: 03/21/21  7:43 AM   Result Value Ref Range    WBC 8.6 4.1 - 11.1 K/uL    RBC 2.38 (L) 4.10 - 5.70 M/uL    HGB 7.1 (L) 12.1 - 17.0 g/dL    HCT 23.1 (L) 36.6 - 50.3 %    MCV 97.1 80.0 - 99.0 FL    MCH 29.8 26.0 - 34.0 PG    MCHC 30.7 30.0 - 36.5 g/dL    RDW 16.2 (H) 11.5 - 14.5 %    PLATELET 422 436 - 978 K/uL    MPV 9.7 8.9 - 12.9 FL    NRBC 0.7 (H) 0  WBC    ABSOLUTE NRBC 0.06 (H) 0.00 - 0.01 K/uL    NEUTROPHILS 79 (H) 32 - 75 %    LYMPHOCYTES 12 12 - 49 %    MONOCYTES 8 5 - 13 %    EOSINOPHILS 0 0 - 7 %    BASOPHILS 0 0 - 1 %    IMMATURE GRANULOCYTES 1 (H) 0.0 - 0.5 %    ABS. NEUTROPHILS 6.8 1.8 - 8.0 K/UL    ABS. LYMPHOCYTES 1.0 0.8 - 3.5 K/UL    ABS. MONOCYTES 0.7 0.0 - 1.0 K/UL    ABS. EOSINOPHILS 0.0 0.0 - 0.4 K/UL    ABS. BASOPHILS 0.0 0.0 - 0.1 K/UL    ABS. IMM.  GRANS. 0.1 (H) 0.00 - 0.04 K/UL    DF AUTOMATED METABOLIC PANEL, COMPREHENSIVE    Collection Time: 03/21/21  7:43 AM   Result Value Ref Range    Sodium 142 136 - 145 mmol/L    Potassium 3.1 (L) 3.5 - 5.1 mmol/L    Chloride 108 97 - 108 mmol/L    CO2 26 21 - 32 mmol/L    Anion gap 8 5 - 15 mmol/L    Glucose 99 65 - 100 mg/dL    BUN 45 (H) 6 - 20 MG/DL    Creatinine 1.87 (H) 0.70 - 1.30 MG/DL    BUN/Creatinine ratio 24 (H) 12 - 20      GFR est AA 41 (L) >60 ml/min/1.73m2    GFR est non-AA 34 (L) >60 ml/min/1.73m2    Calcium 8.1 (L) 8.5 - 10.1 MG/DL    Bilirubin, total 0.7 0.2 - 1.0 MG/DL    ALT (SGPT) 19 12 - 78 U/L    AST (SGOT) 36 15 - 37 U/L    Alk. phosphatase 213 (H) 45 - 117 U/L    Protein, total 7.6 6.4 - 8.2 g/dL    Albumin 2.8 (L) 3.5 - 5.0 g/dL    Globulin 4.8 (H) 2.0 - 4.0 g/dL    A-G Ratio 0.6 (L) 1.1 - 2.2     TROPONIN I    Collection Time: 03/21/21  7:43 AM   Result Value Ref Range    Troponin-I, Qt. 0.29 (H) <0.05 ng/mL   PROTHROMBIN TIME + INR    Collection Time: 03/21/21  7:43 AM   Result Value Ref Range    INR 1.5 (H) 0.9 - 1.1      Prothrombin time 15.4 (H) 9.0 - 11.1 sec   MAGNESIUM    Collection Time: 03/21/21  7:43 AM   Result Value Ref Range    Magnesium 2.6 (H) 1.6 - 2.4 mg/dL   PTT    Collection Time: 03/21/21  7:43 AM   Result Value Ref Range    aPTT 28.8 22.1 - 31.0 sec    aPTT, therapeutic range     58.0 - 77.0 SECS   NT-PRO BNP    Collection Time: 03/21/21  7:43 AM   Result Value Ref Range    NT pro-BNP 30,265 (H) <450 PG/ML   TYPE & SCREEN    Collection Time: 03/21/21  7:43 AM   Result Value Ref Range    Crossmatch Expiration 03/24/2021,2351     ABO/Rh(D) A POSITIVE     Antibody screen NEG     Unit number [id number]     Blood component type RC LR     Unit division 00     Status of unit ALLOCATED     Crossmatch result Compatible    RBC, ALLOCATE    Collection Time: 03/21/21  7:45 AM   Result Value Ref Range    HISTORY CHECKED?  Historical check performed    SARS-COV-2    Collection Time: 03/21/21  8:08 AM   Result Value Ref Range SARS-CoV-2 Please find results under separate order     OCCULT BLOOD, STOOL    Collection Time: 03/21/21  8:08 AM   Result Value Ref Range    Occult blood, stool Positive (A) NEG

## 2021-03-21 NOTE — CONSULTS
Gastroenterology Consultation Note  Dr. Solitario Collier    NAME: Reshma Headley : 1931 MRN: [id number]   PCP: Barrett Tinajero MD   Primary GI : Dr. Ayla Laird  Date/Time:  3/21/2021 1:12 PM  Subjective:   REASON FOR CONSULT:      Reshma Headley is a 80 y.o.  male who I was asked to see for anemia with SOB. Patient is being admitted via the ED for severe SOB after presenting to [facility] ED. Called EMS for worsening SOB and Sats were 60% when EMS arrived. Placed on BIPAS and CXR showed b/l patchy opacities with Troponin 0.25 with Hgb 7.1. He was placed on BIPAP and now down to 30% FiO2. He is on Xarelto for h/o A fib s/p pacemaker and previous TIA. Occult stool in ED was heme + but no documented GI bleeding. Pt denies any n/v/black stool or BRBPR. Has been evaluated by Dr. Ayla Laird and had EGD/COLON in 2017 (EGD: Mild gastritis, mild esophagitis on path; COLON: (2) tiny polyps removed; sigmoid tics; hemorrhoids).       Past Medical History:   Diagnosis Date    Ill-defined condition     elevated cholesterol    Stroke [facility])           Past Surgical History:   Procedure Laterality Date    COLONOSCOPY N/A 2017    COLONOSCOPY,EGD performed by Marquis Mclain MD at [address]  2017         HX APPENDECTOMY      HX CATARACT REMOVAL      bilateral    HX ORTHOPAEDIC      left TKR    HX PACEMAKER      nov 3 2016 pacemaker/left upper chest/Dr.George Morales    [place] ABDOMEN SURGERY PROC UNLISTED      hernia repair    UPPER GI ENDOSCOPY,BIOPSY  2017          Social History     Tobacco Use    Smoking status: Former Smoker     Packs/day: 2.00     Years: 20.00     Pack years: 40.00     Quit date: 1998     Years since quittin.1    Smokeless tobacco: Never Used   Substance Use Topics    Alcohol use: No      Family History   Problem Relation Age of Onset    Heart Disease Mother     Diabetes Mother     Cancer Mother     Heart Disease Father    Kasia Colon Lung Disease Father     Cancer Brother     Heart Disease Brother     Psychiatric Disorder Brother     Hypertension Brother     Elevated Lipids Brother     Psychiatric Disorder Sister     Hypertension Sister       No Known Allergies   Home Medications:  Prior to Admission Medications   Prescriptions Last Dose Informant Patient Reported? Taking?   atorvastatin (LIPITOR) 10 mg tablet   No No   Sig: Take 1 Tab by mouth nightly. cholecalciferol (VITAMIN D3) 1,000 unit tablet   Yes No   Sig: Take 1,000 Units by mouth daily. co-enzyme Q-10 (CO Q-10) 100 mg capsule   Yes No   Sig: Take 100 mg by mouth daily. multivitamin (ONE A DAY) tablet   Yes No   Sig: Take 1 Tab by mouth daily. rivaroxaban (XARELTO) 15 mg tab tablet   Yes No   Sig: Take 15 mg by mouth daily (with dinner).       Facility-Administered Medications: None     Hospital medications:  Current Facility-Administered Medications   Medication Dose Route Frequency    0.9% sodium chloride infusion 250 mL  250 mL IntraVENous PRN    acetaminophen (TYLENOL) tablet 650 mg  650 mg Oral Q6H PRN    cefTRIAXone (ROCEPHIN) 1 g in 0.9% sodium chloride (MBP/ADV) 50 mL MBP  1 g IntraVENous Q24H    sodium chloride (NS) flush 5-40 mL  5-40 mL IntraVENous Q8H    sodium chloride (NS) flush 5-40 mL  5-40 mL IntraVENous PRN    acetaminophen (TYLENOL) tablet 650 mg  650 mg Oral Q6H PRN    Or    acetaminophen (TYLENOL) suppository 650 mg  650 mg Rectal Q6H PRN    polyethylene glycol (MIRALAX) packet 17 g  17 g Oral DAILY PRN    promethazine (PHENERGAN) tablet 12.5 mg  12.5 mg Oral Q6H PRN    Or    ondansetron (ZOFRAN) injection 4 mg  4 mg IntraVENous Q6H PRN    atorvastatin (LIPITOR) tablet 10 mg  10 mg Oral QHS    azithromycin (ZITHROMAX) 500 mg in 0.9% sodium chloride 250 mL (VIAL-MATE)  500 mg IntraVENous Q24H    furosemide (LASIX) injection 40 mg  40 mg IntraVENous BID    pantoprazole (PROTONIX) 40 mg in 0.9% sodium chloride 10 mL injection  40 mg IntraVENous Q12H    midodrine (PROAMATINE) tablet 10 mg  10 mg Oral TID WITH MEALS     Current Outpatient Medications   Medication Sig    rivaroxaban (XARELTO) 15 mg tab tablet Take 15 mg by mouth daily (with dinner).  multivitamin (ONE A DAY) tablet Take 1 Tab by mouth daily.  cholecalciferol (VITAMIN D3) 1,000 unit tablet Take 1,000 Units by mouth daily.  co-enzyme Q-10 (CO Q-10) 100 mg capsule Take 100 mg by mouth daily.  atorvastatin (LIPITOR) 10 mg tablet Take 1 Tab by mouth nightly. REVIEW OF SYSTEMS:      Review of Systems -   History obtained from chart review and the patient  General ROS: positive for  - fatigue  Psychological ROS: positive for - memory difficulties  Hematological and Lymphatic ROS: positive for - blood transfusions  Respiratory ROS: positive for - cough and shortness of breath  Cardiovascular ROS: positive for - dyspnea on exertion  Gastrointestinal ROS: no abdominal pain, change in bowel habits, or black or bloody stools  Genito-Urinary ROS: no dysuria, trouble voiding, or hematuria  Musculoskeletal ROS: positive for - back pain  Neurological ROS: positive for - dizziness  Dermatological ROS: negative    Objective:   VITALS:    Visit Vitals  /64   Pulse 95   Temp 97.9 °F (36.6 °C)   Resp 21   Ht 6' 1\" (1.854 m)   Wt 79.4 kg (175 lb)   SpO2 96%   BMI 23.09 kg/m²     Temp (24hrs), Av °F (36.7 °C), Min:97.7 °F (36.5 °C), Max:99 °F (37.2 °C)    PHYSICAL EXAM:   General:    Elderly, dyspneic WM, unable to speak in complete sentences breathing on BIPAP . Head:   Normocephalic, without obvious abnormality, atraumatic. Eyes:   Conjunctivae clear, anicteric sclerae. Nose:  Nares normal. No drainage or sinus tenderness. Throat:    Lips, mucosa, and tongue normal.  No Thrush  Neck:  Supple, symmetrical,  no adenopathy, thyroid: non tender  Back:    Symmetric,  No CVA tenderness. Lungs:   Crackles b/l.   Heart:   Regular rate and rhythm,  no murmur, rub or gallop. Abdomen:   Soft, nontender, nondistended, + BS, no HSM  Rectal:  deferred  Extremities: No cyanosis. No edema. No clubbing  Skin:     Texture, turgor normal. No rashes/lesions/jaundice  Lymph: Cervical, supraclavicular normal.  Psych:  Slightly anxius  Neurologic: No facial asymmetry. No aphasia or slurred speech     LAB DATA REVIEWED:    Lab Results   Component Value Date/Time    WBC 8.6 03/21/2021 07:43 AM    HGB 7.1 (L) 03/21/2021 07:43 AM    HCT 23.1 (L) 03/21/2021 07:43 AM    PLATELET 576 44/29/3205 07:43 AM    MCV 97.1 03/21/2021 07:43 AM     Lab Results   Component Value Date/Time    ALT (SGPT) 19 03/21/2021 07:43 AM    Alk.  phosphatase 213 (H) 03/21/2021 07:43 AM    Bilirubin, total 0.7 03/21/2021 07:43 AM     Lab Results   Component Value Date/Time    Sodium 142 03/21/2021 07:43 AM    Potassium 3.1 (L) 03/21/2021 07:43 AM    Chloride 108 03/21/2021 07:43 AM    CO2 26 03/21/2021 07:43 AM    Anion gap 8 03/21/2021 07:43 AM    Glucose 99 03/21/2021 07:43 AM    BUN 45 (H) 03/21/2021 07:43 AM    Creatinine 1.87 (H) 03/21/2021 07:43 AM    BUN/Creatinine ratio 24 (H) 03/21/2021 07:43 AM    GFR est AA 41 (L) 03/21/2021 07:43 AM    GFR est non-AA 34 (L) 03/21/2021 07:43 AM    Calcium 8.1 (L) 03/21/2021 07:43 AM     No results found for: LPSE  Lab Results   Component Value Date/Time    INR 1.5 (H) 03/21/2021 07:43 AM    INR 1.3 (H) 12/07/2016 03:29 PM    INR 1.0 12/27/2012 08:00 PM    Prothrombin time 15.4 (H) 03/21/2021 07:43 AM    Prothrombin time 12.7 (H) 12/07/2016 03:29 PM    Prothrombin time 10.7 12/27/2012 08:00 PM     Lab Results   Component Value Date/Time    Iron 21 (L) 03/21/2021 09:21 AM    TIBC 211 (L) 03/21/2021 09:21 AM    Iron % saturation 10 (L) 03/21/2021 09:21 AM    Ferritin 95 03/21/2021 09:21 AM     Impression:    Acute respiratory failure with hypoxia     Iron Def anemia    Pneumonia    Acute Renal Failure    Acute CHF    A fib on Xarelto PTA     Plan:  Patient has some degree of chronic anemia and last value was in 2016 and was below 8. He is on xarelto for A fib with heme + stool but no overt melena or BRBPR.   He is iron deficient and will likely require at least an EGD but not appropriate for any invasive GI procedures currently due to his O2 requirement and poor respiratory status.  -agree with holding Xarelto  -daily CBC and transfuse as per protocol  -IV PPI BID  -Dr. Gracy Jurado or partner to see in am and then ultimate decision as to when an EGD may be considered         ___________________________________________________  Care Plan discussed with:    [x]    Patient   []    Family   [x]    Nursing   []    Attending   ___________________________________________________  GI: Clif Tao MD

## 2021-03-21 NOTE — CONSULTS
Consult    NAME: Edwyna Ormond   :  1931   MRN:  [id number]     Date/Time:  3/21/2021 12:02 PM    Patient PCP: Amada Mcdaniels MD  ________________________________________________________________________     Assessment:     3/21:  Presents with hypoxemic resp failure with bilateral infiltrates, on bipap anemia with Hg 7.1, increas trop 0.25      :  Doing well; intermittent use of midodrine. No CP or SOB. Wife says his Romy Payneg" is worse. No palps.  (VV): Doing well. BPs better since last OV and needs midodrine less often. Takes maybe three times per week. Mild intermittent OSEGUERA is unchanged. No CP, syncope, falls or edema. 10/19:  BPs have been lower. He has a compression fx now. Memory loss and confusion becoming an issue. Dermatologist said he has poor circulation in his feet. : Follow up after adding midodrine. Isn't needing the med every day, but dizziness has improved since starting it. No CP, SOB, palpitations, edema or syncope. Foot infection is improving. 3/19:  Dizzy and LH when standing up or changing positions for the past few weeks. No syncope. No CP, SOB, orthopnea, PND, JULIAN. Gets leg cramps in the AM.  Concerned about an infection on his feet. : Doing ok. Recently diagnosed with 2 compression fractures in his thoracic spine. Also continues to have trouble with memory. Chronic SOB is stable. No lightheadedness, CP, palpitations. Goes to PT twice per week, walks dog daily. Weight down 13 lbs.  3/18: Some SOB with exertion in the cold weather, but overall doing \"very well\". He can go up and down the stairs without any issues. No CP, palpitations, syncope, edema. ASA stopped January d/t gastritis and + hemeoccult. :  Doing ok; Hgb better; seeing heme next month; inc OSEGUERA but no CP. No bleeding. Seeing naturopath for reflux. 3/17:  Doing well. Had EGD/cscope with esophagitis/gastritis. Also with chest CTA indicating moderate coronary Ca++.   Remains without exertional symptoms. Active. No complaints. GI wants to start PPI.  12/16:  feels SBO and dizzy, but not as bad as he has in the past.  Hgb recently 7.4 and 8.3 (on xarelto). Says he recently had a CXR with flui on his lungs. No orthopnea or JULIAN. No PND. Does have a NP cough. No syncope. No CP. Getting O2 at home from his PCP      Problems:  1. SSS. History of bradycardia. s/p PPM.  2. Atrial Fibrillation. Did not tolerate cardizem due to SSS. On Xarelto. He has been in and out of a fib over last few years by device check. 3. Mild bilateral carotid disease. His echo shows normal LV EF and carotids show mild disease in right and left carotids. 4. TIA. 5.  Hypotension, orthostatic  6. Asbestosis  7. Abnormal MPI 9/2017 with small region of posterior partial reversibility using pharmacological stress, consistent with branch vessel distribution ischemia. Symptoms improved with addition of Toprol XL 12.5mg. . Retired from [employer] as . Plan:     - CHF vs. Pneumonia, [place], increased pBNP, but no peripheral edema  - Afib  - Increased troponin, no chest pain, no acute ST changes  - Anemia receiving tx  - Elderly    Poor candidate for invasive rx. Would start with medical Rx. Check echo  Taper bipap    - Holding xarelto, currently receiving tx  - Holding asa due to anemia  - Holding beta blocker due to orthostatic hypotension  - Cont Midodrine 10mg TID with hold parameters  - Currently on lasix 40mg iv bid, follow bmp, try to taper bipap        [x]        High complexity decision making was performed        Subjective:   CHIEF COMPLAINT: shortness of breath  HISTORY OF PRESENT ILLNESS:       Yousuf Martin is a 80 y.o.  male with a past medical history of sick sinus syndrome has a pacemaker, A. fib, hyperlipidemia, TIA, history of GI bleed he is a transfer from [facility] ED.   As per the patient, chart, patient has been experiencing increased dyspnea and cough for past several days. And this morning he acutely got short of breath, when EMS arrived he was saturating in 60%, was placed on CPAP by EMS, he was given Nitropaste and 40 of Lasix, and transferred over to [facility] ER. Over there he was placed on BiPAP, chest x-ray showed bilateral patchy opacities, troponin was 0.25, normal white count, creatinine 1.7. Elevated proBNP. He was given azithromycin and Rocephin over there and was transferred here in the ED for the higher level of care. -Patient seen in the ED here, currently on BiPAP, initially 100% FiO2, which was weaned down to 30% FiO2. Is currently on BiPAP, reports he feels better. No chest pain. Reports his back is bothering him which is chronic. Denies any recent weight gain      We were asked to consult for work up and evaluation of the above problems.      Past Medical History:   Diagnosis Date    Ill-defined condition     elevated cholesterol    Stroke [facility])           Past Surgical History:   Procedure Laterality Date    COLONOSCOPY N/A 2017    [facility]all[facility] at [facility] ENDOSCOPY    [facility]  [facility]         HX APPENDECTOMY      HX CATARACT REMOVAL      bilateral    HX ORTHOPAEDIC      left TKR    HX PACEMAKER      nov 3 2016 pacemaker/left upper chest/Dr.George Morales    [place] ABDOMEN SURGERY PROC UNLISTED      hernia repair    UPPER GI ENDOSCOPY,BIOPSY  2017          No Known Allergies   Meds:  See below  Social History     Tobacco Use    Smoking status: Former Smoker     Packs/day: 2.00     Years: 20.00     Pack years: 40.00     Quit date: 1998     Years since quittin.1    Smokeless tobacco: Never Used   Substance Use Topics    Alcohol use: No      Family History   Problem Relation Age of Onset    Heart Disease Mother     Diabetes Mother     Cancer Mother     Heart Disease Father     Lung Disease Father     Cancer Brother     Heart Disease Brother     Psychiatric Disorder Brother     Hypertension Brother     Elevated Lipids Brother     Psychiatric Disorder Sister     Hypertension Sister        REVIEW OF SYSTEMS:     []         Unable to obtain  ROS due to ---   [x]         Total of 12 systems reviewed as follows: Total of 12 systems reviewed as follows:       POSITIVE= Bold text  Negative = normal text  General:  fever, chills, sweats, generalized weakness, weight loss/gain,      loss of appetite   Eyes:    blurred vision, eye pain, loss of vision, double vision  ENT:    rhinorrhea, pharyngitis   Respiratory:   cough, sputum production, SOB, OSEGUERA, wheezing, pleuritic pain   Cardiology:   chest pain, palpitations, orthopnea, PND, edema, syncope   Gastrointestinal:  abdominal pain , N/V, diarrhea, dysphagia, constipation, bleeding   Genitourinary:  frequency, urgency, dysuria, hematuria, incontinence   Muskuloskeletal :  arthralgia, myalgia, back pain  Hematology:  easy bruising, nose or gum bleeding, lymphadenopathy   Dermatological: rash, ulceration, pruritis, color change / jaundice  Endocrine:   hot flashes or polydipsia   Neurological:  headache, dizziness, confusion, focal weakness, paresthesia,     Speech difficulties, memory loss, gait difficulty  Psychological: Feelings of anxiety, depression, agitation    Objective:      Physical Exam:    Last 24hrs VS reviewed since prior progress note. Most recent are:    Visit Vitals  BP (!) 150/74   Pulse 71   Temp 98 °F (36.7 °C)   Resp 22   Ht 6' 1\" (1.854 m)   Wt 79.4 kg (175 lb)   SpO2 99%   BMI 23.09 kg/m²     No intake or output data in the 24 hours ending 03/21/21 1202     General Appearance: Well developed, well nourished, alert & oriented x 3,    no acute distress. Ears/Nose/Mouth/Throat: Pupils equal and round, Hearing grossly normal.  Neck: Supple. JVP within normal limits. Carotids good upstrokes, with no bruit. Chest: Lungs ronchi to auscultation bilaterally.   Cardiovascular: irregular rate and rhythm, S1S2 normal, no murmur, rubs, gallops. Abdomen: Soft, non-tender, bowel sounds are active. No organomegaly. Extremities: No edema bilaterally. Femoral pulses +2, Distal Pulses +1. Skin: Warm and dry. Neuro: CN II-XII grossly intact, Strength and sensation grossly intact. Data:      Prior to Admission medications    Medication Sig Start Date End Date Taking? Authorizing Provider   rivaroxaban (XARELTO) 15 mg tab tablet Take 15 mg by mouth daily (with dinner). Provider, Historical   multivitamin (ONE A DAY) tablet Take 1 Tab by mouth daily. Provider, Historical   cholecalciferol (VITAMIN D3) 1,000 unit tablet Take 1,000 Units by mouth daily. Provider, Historical   co-enzyme Q-10 (CO Q-10) 100 mg capsule Take 100 mg by mouth daily. Provider, Historical   atorvastatin (LIPITOR) 10 mg tablet Take 1 Tab by mouth nightly.  12/30/12   Sandro Khan MD       Recent Results (from the past 24 hour(s))   POC LACTIC ACID    Collection Time: 03/21/21  7:38 AM   Result Value Ref Range    Lactic Acid (POC) 1.23 0.40 - 2.00 mmol/L   POC EG7    Collection Time: 03/21/21  7:42 AM   Result Value Ref Range    Calcium, ionized (POC) 1.05 (L) 1.12 - 1.32 mmol/L    pH (POC) 7.52 (H) 7.35 - 7.45      pCO2 (POC) 33.7 (L) 35.0 - 45.0 MMHG    pO2 (POC) 36 (LL) 80 - 100 MMHG    HCO3 (POC) 27.4 (H) 22 - 26 MMOL/L    Base excess (POC) 5 mmol/L    sO2 (POC) 76 (L) 92 - 97 %    Site OTHER      Device: BIPAP      Allens test (POC) N/A      Specimen type (POC) VENOUS BLOOD     CBC WITH AUTOMATED DIFF    Collection Time: 03/21/21  7:43 AM   Result Value Ref Range    WBC 8.6 4.1 - 11.1 K/uL    RBC 2.38 (L) 4.10 - 5.70 M/uL    HGB 7.1 (L) 12.1 - 17.0 g/dL    HCT 23.1 (L) 36.6 - 50.3 %    MCV 97.1 80.0 - 99.0 FL    MCH 29.8 26.0 - 34.0 PG    MCHC 30.7 30.0 - 36.5 g/dL    RDW 16.2 (H) 11.5 - 14.5 %    PLATELET 233 197 - 046 K/uL    MPV 9.7 8.9 - 12.9 FL    NRBC 0.7 (H) 0  WBC    ABSOLUTE NRBC 0.06 (H) 0.00 - 0.01 K/uL    NEUTROPHILS 79 (H) 32 - 75 %    LYMPHOCYTES 12 12 - 49 %    MONOCYTES 8 5 - 13 %    EOSINOPHILS 0 0 - 7 %    BASOPHILS 0 0 - 1 %    IMMATURE GRANULOCYTES 1 (H) 0.0 - 0.5 %    ABS. NEUTROPHILS 6.8 1.8 - 8.0 K/UL    ABS. LYMPHOCYTES 1.0 0.8 - 3.5 K/UL    ABS. MONOCYTES 0.7 0.0 - 1.0 K/UL    ABS. EOSINOPHILS 0.0 0.0 - 0.4 K/UL    ABS. BASOPHILS 0.0 0.0 - 0.1 K/UL    ABS. IMM. GRANS. 0.1 (H) 0.00 - 0.04 K/UL    DF AUTOMATED     METABOLIC PANEL, COMPREHENSIVE    Collection Time: 03/21/21  7:43 AM   Result Value Ref Range    Sodium 142 136 - 145 mmol/L    Potassium 3.1 (L) 3.5 - 5.1 mmol/L    Chloride 108 97 - 108 mmol/L    CO2 26 21 - 32 mmol/L    Anion gap 8 5 - 15 mmol/L    Glucose 99 65 - 100 mg/dL    BUN 45 (H) 6 - 20 MG/DL    Creatinine 1.87 (H) 0.70 - 1.30 MG/DL    BUN/Creatinine ratio 24 (H) 12 - 20      GFR est AA 41 (L) >60 ml/min/1.73m2    GFR est non-AA 34 (L) >60 ml/min/1.73m2    Calcium 8.1 (L) 8.5 - 10.1 MG/DL    Bilirubin, total 0.7 0.2 - 1.0 MG/DL    ALT (SGPT) 19 12 - 78 U/L    AST (SGOT) 36 15 - 37 U/L    Alk.  phosphatase 213 (H) 45 - 117 U/L    Protein, total 7.6 6.4 - 8.2 g/dL    Albumin 2.8 (L) 3.5 - 5.0 g/dL    Globulin 4.8 (H) 2.0 - 4.0 g/dL    A-G Ratio 0.6 (L) 1.1 - 2.2     TROPONIN I    Collection Time: 03/21/21  7:43 AM   Result Value Ref Range    Troponin-I, Qt. 0.29 (H) <0.05 ng/mL   PROTHROMBIN TIME + INR    Collection Time: 03/21/21  7:43 AM   Result Value Ref Range    INR 1.5 (H) 0.9 - 1.1      Prothrombin time 15.4 (H) 9.0 - 11.1 sec   MAGNESIUM    Collection Time: 03/21/21  7:43 AM   Result Value Ref Range    Magnesium 2.6 (H) 1.6 - 2.4 mg/dL   PTT    Collection Time: 03/21/21  7:43 AM   Result Value Ref Range    aPTT 28.8 22.1 - 31.0 sec    aPTT, therapeutic range     58.0 - 77.0 SECS   NT-PRO BNP    Collection Time: 03/21/21  7:43 AM   Result Value Ref Range    NT pro-BNP 30,265 (H) <450 PG/ML   TYPE & SCREEN    Collection Time: 03/21/21  7:43 AM   Result Value Ref Range    Crossmatch Expiration 03/24/2021,2359     ABO/Rh(D) A POSITIVE     Antibody screen NEG     Unit number [id number]     Blood component type  LR     Unit division 00     Status of unit ISSUED     Crossmatch result Compatible    RBC, ALLOCATE    Collection Time: 03/21/21  7:45 AM   Result Value Ref Range    HISTORY CHECKED?  Historical check performed    SARS-COV-2    Collection Time: 03/21/21  8:08 AM   Result Value Ref Range    SARS-CoV-2 Please find results under separate order     INFLUENZA A+B VIRAL AGS    Collection Time: 03/21/21  8:08 AM   Result Value Ref Range    Influenza A Antigen Negative NEG      Influenza B Antigen Negative NEG     OCCULT BLOOD, STOOL    Collection Time: 03/21/21  8:08 AM   Result Value Ref Range    Occult blood, stool Positive (A) NEG     COVID-19 RAPID TEST    Collection Time: 03/21/21  8:08 AM   Result Value Ref Range    Specimen source Nasopharyngeal      COVID-19 rapid test Not detected NOTD     PROCALCITONIN    Collection Time: 03/21/21  9:21 AM   Result Value Ref Range    Procalcitonin 0.92 ng/mL

## 2021-03-21 NOTE — ED NOTES
TRANSFER - OUT REPORT:    Verbal report given to Nile (rogelio) on Joseluis Duvall  [facility] for [facility] progression of care       Report consisted of patients Situation, Background, Assessment and   Recommendations(SBAR). Information from the following report(s) SBAR, Kardex, ED Summary, [facility] and Recent Results was reviewed with the receiving nurse. Lines:   Peripheral IV 03/21/21 Right Antecubital (Active)       Peripheral IV 03/21/21 Left Forearm (Active)        Opportunity for questions and clarification was provided.       Patient transported with:   O2 @ 3 liters

## 2021-03-21 NOTE — ED NOTES
Entered room to find pt had removed gown, cardiac leads, pulse ox, and was attempting to remove Aguiar. Pt was mumbling incomprehensibly and gesturing with hands looking at ceiling. Reconnected, reoriented, and reassured pt.  Assisted to more comfortable position

## 2021-03-21 NOTE — ED NOTES
Respiratory at bedside to remove patient from bipap at this time. Pt. Placed on 3L nasal cannula at this time.

## 2021-03-22 ENCOUNTER — APPOINTMENT (OUTPATIENT)
Dept: GENERAL RADIOLOGY | Age: 86
DRG: 189 | End: 2021-03-22
Attending: INTERNAL MEDICINE
Payer: MEDICARE

## 2021-03-22 LAB
ABO + RH BLD: NORMAL
ANION GAP SERPL CALC-SCNC: 8 MMOL/L (ref 5–15)
ATRIAL RATE: 64 BPM
BASOPHILS # BLD: 0 K/UL (ref 0–0.1)
BASOPHILS NFR BLD: 0 % (ref 0–1)
BLD PROD TYP BPU: NORMAL
BLOOD GROUP ANTIBODIES SERPL: NORMAL
BNP SERPL-MCNC: ABNORMAL PG/ML
BPU ID: NORMAL
BUN SERPL-MCNC: 53 MG/DL (ref 6–20)
BUN/CREAT SERPL: 26 (ref 12–20)
CALCIUM SERPL-MCNC: 8.2 MG/DL (ref 8.5–10.1)
CALCULATED R AXIS, ECG10: -11 DEGREES
CALCULATED T AXIS, ECG11: -55 DEGREES
CHLORIDE SERPL-SCNC: 102 MMOL/L (ref 97–108)
CO2 SERPL-SCNC: 30 MMOL/L (ref 21–32)
CREAT SERPL-MCNC: 2.01 MG/DL (ref 0.7–1.3)
CROSSMATCH RESULT,%XM: NORMAL
DIAGNOSIS, 93000: NORMAL
DIFFERENTIAL METHOD BLD: ABNORMAL
EOSINOPHIL # BLD: 0.1 K/UL (ref 0–0.4)
EOSINOPHIL NFR BLD: 1 % (ref 0–7)
ERYTHROCYTE [DISTWIDTH] IN BLOOD BY AUTOMATED COUNT: 16 % (ref 11.5–14.5)
GLUCOSE SERPL-MCNC: 98 MG/DL (ref 65–100)
HCT VFR BLD AUTO: 26.5 % (ref 36.6–50.3)
HGB BLD-MCNC: 8.3 G/DL (ref 12.1–17)
IMM GRANULOCYTES # BLD AUTO: 0.1 K/UL (ref 0–0.04)
IMM GRANULOCYTES NFR BLD AUTO: 1 % (ref 0–0.5)
LYMPHOCYTES # BLD: 1.3 K/UL (ref 0.8–3.5)
LYMPHOCYTES NFR BLD: 16 % (ref 12–49)
MCH RBC QN AUTO: 29.5 PG (ref 26–34)
MCHC RBC AUTO-ENTMCNC: 31.3 G/DL (ref 30–36.5)
MCV RBC AUTO: 94.3 FL (ref 80–99)
MONOCYTES # BLD: 0.6 K/UL (ref 0–1)
MONOCYTES NFR BLD: 8 % (ref 5–13)
NEUTS SEG # BLD: 5.8 K/UL (ref 1.8–8)
NEUTS SEG NFR BLD: 74 % (ref 32–75)
NRBC # BLD: 0.11 K/UL (ref 0–0.01)
NRBC BLD-RTO: 1.4 PER 100 WBC
P-R INTERVAL, ECG05: 154 MS
PLATELET # BLD AUTO: 174 K/UL (ref 150–400)
PMV BLD AUTO: 9.7 FL (ref 8.9–12.9)
POTASSIUM SERPL-SCNC: 2.4 MMOL/L (ref 3.5–5.1)
POTASSIUM SERPL-SCNC: 2.7 MMOL/L (ref 3.5–5.1)
Q-T INTERVAL, ECG07: 498 MS
QRS DURATION, ECG06: 90 MS
QTC CALCULATION (BEZET), ECG08: 513 MS
RBC # BLD AUTO: 2.81 M/UL (ref 4.1–5.7)
SODIUM SERPL-SCNC: 140 MMOL/L (ref 136–145)
SPECIMEN EXP DATE BLD: NORMAL
STATUS OF UNIT,%ST: NORMAL
UNIT DIVISION, %UDIV: 0
VENTRICULAR RATE, ECG03: 64 BPM
WBC # BLD AUTO: 7.9 K/UL (ref 4.1–11.1)

## 2021-03-22 PROCEDURE — C9113 INJ PANTOPRAZOLE SODIUM, VIA: HCPCS | Performed by: INTERNAL MEDICINE

## 2021-03-22 PROCEDURE — 80048 BASIC METABOLIC PNL TOTAL CA: CPT

## 2021-03-22 PROCEDURE — 36415 COLL VENOUS BLD VENIPUNCTURE: CPT

## 2021-03-22 PROCEDURE — 65660000001 HC RM ICU INTERMED STEPDOWN

## 2021-03-22 PROCEDURE — 97162 PT EVAL MOD COMPLEX 30 MIN: CPT

## 2021-03-22 PROCEDURE — 74011250636 HC RX REV CODE- 250/636: Performed by: INTERNAL MEDICINE

## 2021-03-22 PROCEDURE — 83880 ASSAY OF NATRIURETIC PEPTIDE: CPT

## 2021-03-22 PROCEDURE — 74011000250 HC RX REV CODE- 250: Performed by: INTERNAL MEDICINE

## 2021-03-22 PROCEDURE — 97530 THERAPEUTIC ACTIVITIES: CPT

## 2021-03-22 PROCEDURE — 85025 COMPLETE CBC W/AUTO DIFF WBC: CPT

## 2021-03-22 PROCEDURE — 97530 THERAPEUTIC ACTIVITIES: CPT | Performed by: OCCUPATIONAL THERAPIST

## 2021-03-22 PROCEDURE — 74011250637 HC RX REV CODE- 250/637: Performed by: NURSE PRACTITIONER

## 2021-03-22 PROCEDURE — 71045 X-RAY EXAM CHEST 1 VIEW: CPT

## 2021-03-22 PROCEDURE — 97166 OT EVAL MOD COMPLEX 45 MIN: CPT | Performed by: OCCUPATIONAL THERAPIST

## 2021-03-22 PROCEDURE — 74011000258 HC RX REV CODE- 258: Performed by: INTERNAL MEDICINE

## 2021-03-22 PROCEDURE — 74011250637 HC RX REV CODE- 250/637: Performed by: INTERNAL MEDICINE

## 2021-03-22 PROCEDURE — 77010033678 HC OXYGEN DAILY

## 2021-03-22 PROCEDURE — 84132 ASSAY OF SERUM POTASSIUM: CPT

## 2021-03-22 PROCEDURE — 74011250636 HC RX REV CODE- 250/636: Performed by: NURSE PRACTITIONER

## 2021-03-22 RX ORDER — POTASSIUM CHLORIDE 7.45 MG/ML
10 INJECTION INTRAVENOUS
Status: COMPLETED | OUTPATIENT
Start: 2021-03-22 | End: 2021-03-23

## 2021-03-22 RX ORDER — MIDODRINE HYDROCHLORIDE 5 MG/1
10 TABLET ORAL
Status: DISCONTINUED | OUTPATIENT
Start: 2021-03-22 | End: 2021-03-26 | Stop reason: HOSPADM

## 2021-03-22 RX ORDER — HYDROXYZINE 25 MG/1
25 TABLET, FILM COATED ORAL ONCE
Status: COMPLETED | OUTPATIENT
Start: 2021-03-22 | End: 2021-03-22

## 2021-03-22 RX ORDER — FERROUS SULFATE 137(45) MG
45 TABLET, EXTENDED RELEASE ORAL
COMMUNITY

## 2021-03-22 RX ORDER — POTASSIUM CHLORIDE 14.9 MG/ML
10 INJECTION INTRAVENOUS
Status: COMPLETED | OUTPATIENT
Start: 2021-03-22 | End: 2021-03-22

## 2021-03-22 RX ORDER — CHOLECALCIFEROL (VITAMIN D3) 125 MCG
2000 CAPSULE ORAL DAILY
COMMUNITY

## 2021-03-22 RX ORDER — POTASSIUM CHLORIDE 750 MG/1
40 TABLET, FILM COATED, EXTENDED RELEASE ORAL EVERY 8 HOURS
Status: COMPLETED | OUTPATIENT
Start: 2021-03-22 | End: 2021-03-22

## 2021-03-22 RX ADMIN — POTASSIUM CHLORIDE 10 MEQ: 10 INJECTION, SOLUTION INTRAVENOUS at 21:17

## 2021-03-22 RX ADMIN — MIDODRINE HYDROCHLORIDE 10 MG: 5 TABLET ORAL at 12:01

## 2021-03-22 RX ADMIN — Medication 10 ML: at 19:51

## 2021-03-22 RX ADMIN — FUROSEMIDE 40 MG: 10 INJECTION, SOLUTION INTRAMUSCULAR; INTRAVENOUS at 10:26

## 2021-03-22 RX ADMIN — POTASSIUM CHLORIDE 10 MEQ: 10 INJECTION, SOLUTION INTRAVENOUS at 22:48

## 2021-03-22 RX ADMIN — FUROSEMIDE 40 MG: 10 INJECTION, SOLUTION INTRAMUSCULAR; INTRAVENOUS at 19:50

## 2021-03-22 RX ADMIN — SODIUM CHLORIDE 40 MG: 9 INJECTION, SOLUTION INTRAMUSCULAR; INTRAVENOUS; SUBCUTANEOUS at 19:53

## 2021-03-22 RX ADMIN — POTASSIUM CHLORIDE 40 MEQ: 750 TABLET, EXTENDED RELEASE ORAL at 19:48

## 2021-03-22 RX ADMIN — POTASSIUM CHLORIDE 10 MEQ: 14.9 INJECTION, SOLUTION INTRAVENOUS at 11:53

## 2021-03-22 RX ADMIN — HYDROXYZINE HYDROCHLORIDE 25 MG: 25 TABLET, FILM COATED ORAL at 01:20

## 2021-03-22 RX ADMIN — POTASSIUM CHLORIDE 10 MEQ: 14.9 INJECTION, SOLUTION INTRAVENOUS at 14:00

## 2021-03-22 RX ADMIN — POTASSIUM CHLORIDE 10 MEQ: 14.9 INJECTION, SOLUTION INTRAVENOUS at 13:05

## 2021-03-22 RX ADMIN — SODIUM CHLORIDE 40 MG: 9 INJECTION, SOLUTION INTRAMUSCULAR; INTRAVENOUS; SUBCUTANEOUS at 10:29

## 2021-03-22 RX ADMIN — POTASSIUM CHLORIDE 10 MEQ: 14.9 INJECTION, SOLUTION INTRAVENOUS at 15:31

## 2021-03-22 RX ADMIN — CEFTRIAXONE 1 G: 1 INJECTION, POWDER, FOR SOLUTION INTRAMUSCULAR; INTRAVENOUS at 10:35

## 2021-03-22 RX ADMIN — Medication 10 ML: at 06:24

## 2021-03-22 RX ADMIN — HYDROXYZINE HYDROCHLORIDE 25 MG: 25 TABLET, FILM COATED ORAL at 22:43

## 2021-03-22 RX ADMIN — AZITHROMYCIN MONOHYDRATE 500 MG: 500 INJECTION, POWDER, LYOPHILIZED, FOR SOLUTION INTRAVENOUS at 11:16

## 2021-03-22 RX ADMIN — POTASSIUM CHLORIDE 40 MEQ: 750 TABLET, EXTENDED RELEASE ORAL at 07:28

## 2021-03-22 NOTE — PROGRESS NOTES
Problem: Self Care Deficits Care Plan (Adult)  Goal: *Acute Goals and Plan of Care (Insert Text)  Description: FUNCTIONAL STATUS PRIOR TO ADMISSION: Patient's wife indicates that he was mostly able to manage basic self-care, but she has been assisting more than usual over the past week. Patient does have some dementia (per daughter). Uses a cane for functional mobility. HOME SUPPORT: Lives with wife. Supportive daughter. Occupational Therapy Goals  Initiated 3/22/2021  1. Patient will perform grooming standing at the sink with minimal assistance/contact guard assist within 7 day(s). 2.  Patient will perform upper body dressing with supervision and cues within 7 day(s). 3.  Patient will perform lower body dressing with minimal assistance/contact guard assist within 7 day(s). 4.  Patient will perform toilet transfers with minimal assistance within 7 day(s). 5.  Patient will perform all aspects of toileting with minimal assistance within 7 day(s). Outcome: Not Met    OCCUPATIONAL THERAPY EVALUATION  Patient: Vj Ronquillo (22 y.o. male)  Date: 3/22/2021  Primary Diagnosis: Acute respiratory failure with hypoxia (HCC) [J96.01]        Precautions:  Fall    ASSESSMENT  Based on the objective data described below, the patient presents with significant deficits in self-care in self-care, primarily due to impaired cognition, decreased safety, decreased activity tolerance, general weakness, and poor standing balance/tolerance. His BP dropped after standing and HR increased to 149 with minimal activity. Patient was confused and fidgety; oriented to self only. He presents with significant posterior lean. Returned to supine due to low BP. Patient's wife was unclear on his prior level of function, but did mention a history of some cognitive deficits. Patient will benefit from skilled OT treatment to maximize independence and safety in ADL. Current Level of Function Impacting Discharge (ADLs/self-care):  Up to Total A    Functional Outcome Measure: The patient scored 20/100 on the Barthel Index. Patient will benefit from skilled therapy intervention to address the above noted impairments. PLAN :  Recommendations and Planned Interventions: self care training, functional mobility training, therapeutic exercise, balance training, therapeutic activities, cognitive retraining, endurance activities, neuromuscular re-education, patient education, home safety training, and family training/education    Frequency/Duration: Patient will be followed by occupational therapy 3 times a week to address goals. Recommendation for discharge: (in order for the patient to meet his/her long term goals)  Therapy up to 5 days/week in SNF setting    This discharge recommendation:  A follow-up discussion with the attending provider and/or case management is planned    IF patient discharges home will need the following DME: TBD       SUBJECTIVE:   Patient stated I don't know.     OBJECTIVE DATA SUMMARY:   HISTORY:   Past Medical History:   Diagnosis Date    Ill-defined condition     elevated cholesterol    Stroke ([facility] [address]     2012     Past Surgical History:   Procedure Laterality Date    COLONOSCOPY N/A 2/9/2017    COLONOSCOPY,EGD performed by Melody Smith MD at [facility] ENDOSCOPY    COLONOSCOPY,REMV Alie Akins  2/9/2017         HX APPENDECTOMY      HX CATARACT REMOVAL      bilateral    HX ORTHOPAEDIC      left TKR    HX PACEMAKER      nov 3 2016 pacemaker/left upper chest/Dr.George Morales    IA ABDOMEN SURGERY PROC UNLISTED      hernia repair    UPPER GI ENDOSCOPY,BIOPSY  2/9/2017            Expanded or extensive additional review of patient history:     Home Situation  Home Environment: Private residence  One/Two Story Residence: Two story  Lift Chair Available: Yes  Living Alone: No  Support Systems: Child(luciano), Spouse/Significant Other/Partner  Patient Expects to be Discharged to[de-identified] Unknown  Current DME Used/Available at Home: [place], straight, Walker, rolling    Hand dominance: Right    EXAMINATION OF PERFORMANCE DEFICITS:  Cognitive/Behavioral Status:  Neurologic State: Alert;Confused  Orientation Level: Disoriented to place; Disoriented to time;Disoriented to situation;Oriented to person  Cognition: Decreased attention/concentration;Decreased command following; Impaired decision making;Memory loss;Poor safety awareness  Perception: Cues to maintain midline in standing(posterior lean)     Safety/Judgement: Decreased awareness of environment;Decreased awareness of need for safety;Decreased awareness of need for assistance;Decreased insight into deficits    Hearing: Auditory  Auditory Impairment: None    Vision/Perceptual:                                     Range of Motion:  AROM: Generally decreased, functional                         Strength:  Strength: Generally decreased, functional                Coordination: Tone & Sensation:     Sensation: Intact                      Balance:  Sitting: Impaired  Sitting - Static: Fair (occasional)  Sitting - Dynamic: Fair (occasional)  Standing: Impaired; With support  Standing - Static: Constant support;Poor  Standing - Dynamic : Constant support;Poor    Functional Mobility and Transfers for ADLs:  Bed Mobility:  Supine to Sit: Minimum assistance  Sit to Supine: Moderate assistance;Assist x2  Scooting: Minimum assistance    Transfers:  Sit to Stand: Moderate assistance;Assist x2  Stand to Sit: Moderate assistance;Assist x2  Toilet Transfer : Moderate assistance;Assist x2(inferred; BSC only)    ADL Assessment:  Feeding: Setup;Supervision    Oral Facial Hygiene/Grooming: Minimum assistance    Bathing: Moderate assistance    Upper Body Dressing: Minimum assistance    Lower Body Dressing: Total assistance    Toileting:  Total assistance                Functional Measure:  Barthel Index:    Bathin  Bladder: 0  Bowels: 5  Groomin  Dressin  Feedin  Mobility: 0  Stairs: 0  Toilet Use: 0  Transfer (Bed to Chair and Back): 5  Total: 20/100        The Barthel ADL Index: Guidelines  1. The index should be used as a record of what a patient does, not as a record of what a patient could do. 2. The main aim is to establish degree of independence from any help, physical or verbal, however minor and for whatever reason. 3. The need for supervision renders the patient not independent. 4. A patient's performance should be established using the best available evidence. Asking the patient, friends/relatives and nurses are the usual sources, but direct observation and common sense are also important. However direct testing is not needed. 5. Usually the patient's performance over the preceding 24-48 hours is important, but occasionally longer periods will be relevant. 6. Middle categories imply that the patient supplies over 50 per cent of the effort. 7. Use of aids to be independent is allowed. David Hamilton., Barthel, D.W. (0163). Functional evaluation: the Barthel Index. [address] [facility] (14)2. [facility] kaykay GAGE Phan, Benito Coley., Meaghan Conway., [place], [address] [place] [address] (1999). Measuring the change indisability after inpatient rehabilitation; comparison of the responsiveness of the Barthel Index and Functional [place] Measure. Journal of Neurology, Neurosurgery, and Psychiatry, 66(4), 887-905. Lidia Lockhart N.J.RAE, LORI Cardenas, & Kamlesh Adkins M.A. (2004.) Assessment of post-stroke quality of life in cost-effectiveness studies: The usefulness of the Barthel Index and the EuroQoL-5D.  Quality of Life Research, 15, 831-52        Occupational Therapy Evaluation Charge Determination   History Examination Decision-Making   MEDIUM Complexity : Expanded review of history including physical, cognitive and psychosocial  history  MEDIUM Complexity : 3-5 performance deficits relating to physical, cognitive , or psychosocial skils that result in activity limitations and / or participation restrictions MEDIUM Complexity : Patient may present with comorbidities that affect occupational performnce. Miniml to moderate modification of tasks or assistance (eg, physical or verbal ) with assesment(s) is necessary to enable patient to complete evaluation       Based on the above components, the patient evaluation is determined to be of the following complexity level: MEDIUM  Pain Rating:  No complaints    Activity Tolerance:   Poor, observed SOB with activity, and signs and symptoms of orthostatic hypotension    After treatment patient left in no apparent distress:    Supine in bed, Call bell within reach, Caregiver / family present, and Side rails x 3    COMMUNICATION/EDUCATION:   The patients plan of care was discussed with: Physical therapist and Registered nurse. Patient is unable to participate in goal setting and plan of care. This patients plan of care is appropriate for delegation to [facility].     Thank you for this referral.  LEILANI Wellington/L  Time Calculation: 21 mins no bladder dysfunction/no bowel dysfunction/no anorexia

## 2021-03-22 NOTE — PROGRESS NOTES
Problem: Mobility Impaired (Adult and Pediatric)  Goal: *Acute Goals and Plan of Care (Insert Text)  Description: FUNCTIONAL STATUS PRIOR TO ADMISSION: Patient was modified independent using a single point cane and wife for support for functional mobility. Pt's spouse at bedside and reported pt's mobility was declining over the past 5-6 days. Pt with history of a fall in Dec. 2020. Pt with cognitive impairment at baseline    [address]: The patient lived with spouse and was requiring increased assistance for mobility ~5-6 days leading up to admission. Physical Therapy Goals  Initiated 3/22/2021  1. Patient will move from supine to sit and sit to supine  in bed with minimal assistance/contact guard assist within 7 day(s). 2.  Patient will transfer from bed to chair and chair to bed with minimal assistance/contact guard assist using the least restrictive device within 7 day(s). 3.  Patient will perform sit to stand with minimal assistance/contact guard assist within 7 day(s). 4.  Patient will ambulate with minimal assistance/contact guard assist for 25 feet with the least restrictive device within 7 day(s). Outcome: Progressing Towards Goal   PHYSICAL THERAPY EVALUATION  Patient: Yousuf Martin (30 y.o. male)  Date: 3/22/2021  Primary Diagnosis: Acute respiratory failure with hypoxia (HCC) [J96.01]        Precautions:   Fall      ASSESSMENT  Based on the objective data described below, the patient presents with generalized weakness, decreased functional mobility, impaired seated and standing balance,confusion, orthostatic hypotension, elevated HR, decreased tolerance to activity s/p admission on 3/21 with SOB. Pt with acute respiratory failure and CHF. Pt received supine in bed on 4L of oxygen; spouse and daughter at bedside. Pt disoriented and followed minimal commands. Pt completed bed mobility with min A and additional time and effort.  Pt performed sit<>stand with mod A x 2 and noted strong posterior trunk leaning and difficult to correct to midline. Pt's HR elevated to 149 bpm in standing. Pt returned to supine position with all needs met and bed alarm set for safety. Pt is functioning well below baseline mobility, at increased risk for falls. Pt will benefit from SNF placement upon discharge to continue therapy efforts. BP supine: 115/72  BP sitting after standin/66  BP supine:104/77. Current Level of Function Impacting Discharge (mobility/balance): mod A x 2 sit<>stand, poor standing balance, orthostatic hypotensive and elevated HR--further mobility deferred    Functional Outcome Measure: The patient scored Total: 20/100 on the Barthel Index which is indicative of 80% impaired ability to care for basic self needs/dependency on others. Other factors to consider for discharge: history of falls, baseline confusion, lives with elderly wife     Patient will benefit from skilled therapy intervention to address the above noted impairments. PLAN :  Recommendations and Planned Interventions: bed mobility training, transfer training, gait training, therapeutic exercises, neuromuscular re-education, patient and family training/education, and therapeutic activities      Frequency/Duration: Patient will be followed by physical therapy:  3 times a week to address goals. Recommendation for discharge: (in order for the patient to meet his/her long term goals)  Therapy up to 5 days/week in SNF setting    This discharge recommendation:  Has been made in collaboration with the attending provider and/or case management    IF patient discharges home will need the following DME: to be determined (TBD)         SUBJECTIVE:   Patient stated If that's what you're into.    pt stated after asking the therapist asked him to sit EOB    OBJECTIVE DATA SUMMARY:   HISTORY:    Past Medical History:   Diagnosis Date    Ill-defined condition     elevated cholesterol    Stroke [facility])      Past Surgical History:   Procedure Laterality Date    COLONOSCOPY N/A 2/9/2017    COLONOSCOPY,EGD performed by Luanne Georges MD at [facility] ENDOSCOPY    COLONOSCOPY,DINO Lei  2/9/2017         HX APPENDECTOMY      HX CATARACT REMOVAL      bilateral    HX ORTHOPAEDIC      left TKR    HX PACEMAKER      nov 3 2016 pacemaker/left upper chest/Dr.George Morales    SC ABDOMEN SURGERY PROC UNLISTED      hernia repair    UPPER GI ENDOSCOPY,BIOPSY  2/9/2017            Personal factors and/or comorbidities impacting plan of care:     Home Situation  Home Environment: Private residence  One/Two Story Residence: Two story  Lift Chair Available: Yes  Living Alone: No  Support Systems: Child(luciano), Spouse/Significant Other/Partner  Patient Expects to be Discharged to[de-identified] Unknown  Current DME Used/Available at Home: Cane, straight, Walker, rolling    EXAMINATION/PRESENTATION/DECISION MAKING:   Critical Behavior:  Neurologic State: Alert, Confused  Orientation Level: Disoriented to place, Disoriented to time, Disoriented to situation, Oriented to person  Cognition: Decreased attention/concentration, Decreased command following, Impaired decision making, Memory loss, Poor safety awareness  Safety/Judgement: Decreased awareness of environment, Decreased awareness of need for safety, Decreased awareness of need for assistance, Decreased insight into deficits  Hearing: Auditory  Auditory Impairment: None    Range Of Motion:  AROM: Generally decreased, functional                       Strength:    Strength: Generally decreased, functional                    Tone & Sensation:                  Sensation: Intact               Coordination:     Vision:      Functional Mobility:  Bed Mobility:     Supine to Sit: Minimum assistance  Sit to Supine: Moderate assistance;Assist x2  Scooting: Minimum assistance  Transfers:  Sit to Stand:  Moderate assistance;Assist x2  Stand to Sit: Moderate assistance;Assist x2                       Balance: Sitting: Impaired  Sitting - Static: Fair (occasional)  Sitting - Dynamic: Fair (occasional)  Standing: Impaired; With support  Standing - Static: Constant support;Poor  Standing - Dynamic : Constant support;Poor  Ambulation/Gait Training:                                                        Stairs: Therapeutic Exercises:       Functional Measure:  Barthel Index:    Bathin  Bladder: 0  Bowels: 5  Groomin  Dressin  Feedin  Mobility: 0  Stairs: 0  Toilet Use: 0  Transfer (Bed to Chair and Back): 5  Total: 20/100       The Barthel ADL Index: Guidelines  1. The index should be used as a record of what a patient does, not as a record of what a patient could do. 2. The main aim is to establish degree of independence from any help, physical or verbal, however minor and for whatever reason. 3. The need for supervision renders the patient not independent. 4. A patient's performance should be established using the best available evidence. Asking the patient, friends/relatives and nurses are the usual sources, but direct observation and common sense are also important. However direct testing is not needed. 5. Usually the patient's performance over the preceding 24-48 hours is important, but occasionally longer periods will be relevant. 6. Middle categories imply that the patient supplies over 50 per cent of the effort. 7. Use of aids to be independent is allowed. Marlon Ochoa., Barthel, D.W. (1261). Functional evaluation: the Barthel Index. [address] [facility] (14)2. GAGE Foreman, Andreas Hanson.[facility], [address] [place] [address] (). Measuring the change indisability after inpatient rehabilitation; comparison of the responsiveness of the Barthel Index and Functional [place] Measure. Journal of Neurology, Neurosurgery, and Psychiatry, 66(4), 026-861.   Selam Bundy, N.J.A, TAMAR Cardenas.MIKO, & Leon Sykes MROBERT. (2004.) Assessment of post-stroke quality of life in cost-effectiveness studies: The usefulness of the Barthel Index and the EuroQoL-5D. Quality of Life Research, 13, 225-10           Based on the above components, the patient evaluation is determined to be of the following complexity level: MEDIUM    Pain Rating:  Pt denied pain and appeared to be in no distress    Activity Tolerance:   Fair and orthostatic hypotension    After treatment patient left in no apparent distress:   Supine in bed, Call bell within reach, Bed / chair alarm activated, Caregiver / family present, and Side rails x 3    COMMUNICATION/EDUCATION:   The patients plan of care was discussed with: Occupational therapist and Registered nurse. Fall prevention education was provided and the patient/caregiver indicated understanding., Patient/family have participated as able in goal setting and plan of care., Patient/family agree to work toward stated goals and plan of care. , and Patient is unable to participate in goal setting and plan of care.     Thank you for this referral.  Christopher Malagon, PT, DPT   Time Calculation: 20 mins

## 2021-03-22 NOTE — PROGRESS NOTES
Physical Therapy Screening:    An [facility] screening referral was triggered for physical therapy based on results obtained during the nursing admission assessment. The patients chart was reviewed and the patient is appropriate for a skilled therapy evaluation if there is a decline in functional mobility from baseline. Please order a consult for physical therapy if you are in agreement and would like an evaluation to be completed. Thank you.     Michael Grimm, PT, DPT

## 2021-03-22 NOTE — PROGRESS NOTES
Problem: Falls - Risk of  Goal: *Absence of Falls  Description: Document Bard Lawson Fall Risk and appropriate interventions in the flowsheet.   Outcome: Progressing Towards Goal  Note: Fall Risk Interventions:  Mobility Interventions: Bed/chair exit alarm    Mentation Interventions: Bed/chair exit alarm, Adequate sleep, hydration, pain control    Medication Interventions: Patient to call before getting OOB, Teach patient to arise slowly    Elimination Interventions: Call light in reach, Bed/chair exit alarm    History of Falls Interventions: Bed/chair exit alarm         Problem: Breathing Pattern - Ineffective  Goal: *Absence of hypoxia  Outcome: Progressing Towards Goal  Goal: *Use of effective breathing techniques  Outcome: Progressing Towards Goal  Goal: *PALLIATIVE CARE:  Alleviation of Dyspnea  Outcome: Progressing Towards Goal

## 2021-03-22 NOTE — PROGRESS NOTES
End of Shift Note    Bedside shift change report given to Agata Ro RN (oncoming nurse) by Margi Pino RN (offgoing nurse). Report included the following information SBAR and Kardex    Shift worked:  [id number]     Shift summary and any significant changes:    Potassium 2.4 with morning labs. Notified NP Paulina Rhodes. Orders received. Concerns for physician to address:  none.       Zone phone for oncoming shift:              Margi Pino RN

## 2021-03-22 NOTE — PROGRESS NOTES
Pharmacy Medication Reconciliation     Recommendations/Findings: The following amendments were made to the patient's active medication list on file at [facility]:   1) Additions:   - aloe vera juice  - ferrous sulfate  - Heme Iron SAP  - Max B-ND    2) Deletions:   - co Q-10  - atorvastatin    3) Changes:   - vitamin D dose      -Clarified PTA med list with patient wife. PTA medication list was corrected to the following:     Prior to Admission Medications   Prescriptions Last Dose Informant Taking? ALOE VERA PO   Yes   Sig: Take 2 oz by mouth daily. Aloe Vera Juice - take 2 ounces by mouth daily   OTHER   Yes   Sig: Take 1 Cap by mouth daily. Heme Iron SAP   OTHER   Yes   Sig: Take  by mouth daily. 1/2 teaspoonful Max B-ND in 2 oz water - take by mouth daily   cholecalciferol, vitamin D3, (Vitamin D3) 50 mcg (2,000 unit) tab   Yes   Sig: Take 2,000 Units by mouth daily. ferrous sulfate (Slow Fe) 142 mg (45 mg iron) ER tablet   Yes   Sig: Take 45 mg by mouth Daily (before breakfast). midodrine (PROAMATINE) 10 mg tablet 3/14/2021 at Unknown time  Yes   Sig: Take 10 mg by mouth three (3) times daily as needed. When SBP <110    multivitamin (ONE A DAY) tablet 3/20/2021 at Unknown time  Yes   Sig: Take 1 Tab by mouth daily. rivaroxaban (XARELTO) 15 mg tab tablet 3/20/2021 at Unknown time  Yes   Sig: Take 15 mg by mouth daily (with dinner).       Facility-Administered Medications: None        Thank you,  Roberta Horn, PHARMD

## 2021-03-22 NOTE — PROGRESS NOTES
Received notification from bedside RN about patient with regards to: patient agitation, pulling on aponte, unable to be redirected by daughter at bedside  VS: /69, HR 93, RR 20, O2 sat 93% on NC 4 L     Intervention given: Atarax PO x 1 dose ordered    0427: notified of K+ 2.4  VS: /75, HR 79, RR 18, O2 sat 97% on NC 4 L    - Kdur 40 meq x 2 doses ordered

## 2021-03-22 NOTE — PROGRESS NOTES
Progress Note      3/22/2021 2:56 PM  NAME: Colin Conteh   MRN:  [id number]   Admit Diagnosis: Acute respiratory failure with hypoxia [facility]) [J96.01]      Problem List:   3/21:  Presents with hypoxemic resp failure with bilateral infiltrates, on bipap anemia with Hg 7.1, increas trop 0.25        11/20:  Doing well; intermittent use of midodrine. No CP or SOB. Wife says his Deliah L[place]on" is worse. No palps. 5/20 (VV): Doing well. BPs better since last OV and needs midodrine less often. Takes maybe three times per week. Mild intermittent OSEGUERA is unchanged. No CP, syncope, falls or edema. 10/19:  BPs have been lower. He has a compression fx now. Memory loss and confusion becoming an issue. Dermatologist said he has poor circulation in his feet. 4/19: Follow up after adding midodrine. Isn't needing the med every day, but dizziness has improved since starting it. No CP, SOB, palpitations, edema or syncope. Foot infection is improving. 3/19:  Dizzy and LH when standing up or changing positions for the past few weeks. No syncope. No CP, SOB, orthopnea, PND, JULIAN. Gets leg cramps in the AM.  Concerned about an infection on his feet. 9/18: Doing ok. Recently diagnosed with 2 compression fractures in his thoracic spine. Also continues to have trouble with memory. Chronic SOB is stable. No lightheadedness, CP, palpitations. Goes to PT twice per week, walks dog daily. Weight down 13 lbs.  3/18: Some SOB with exertion in the cold weather, but overall doing \"very well\". He can go up and down the stairs without any issues. No CP, palpitations, syncope, edema. ASA stopped January d/t gastritis and + hemeoccult. 9/17:  Doing ok; Hgb better; seeing heme next month; inc OSEGUERA but no CP. No bleeding. Seeing naturopath for reflux. 3/17:  Doing well. Had EGD/cscope with esophagitis/gastritis. Also with chest CTA indicating moderate coronary Ca++. Remains without exertional symptoms. Active. No complaints.   GI wants to start PPI.  12/16:  feels SBO and dizzy, but not as bad as he has in the past.  Hgb recently 7.4 and 8.3 (on xarelto). Says he recently had a CXR with flui on his lungs. No orthopnea or JULIAN. No PND. Does have a NP cough. No syncope. No CP. Getting O2 at home from his PCP        Problems:  1. SSS. History of bradycardia. s/p PPM.  2. Atrial Fibrillation. Did not tolerate cardizem due to SSS. On Xarelto. He has been in and out of a fib over last few years by device check. 3. Mild bilateral carotid disease. His echo shows normal LV EF and carotids show mild disease in right and left carotids. 4. TIA. 5.  Hypotension, orthostatic  6. Asbestosis  7. Abnormal MPI 9/2017 with small region of posterior partial reversibility using pharmacological stress, consistent with branch vessel distribution ischemia. Symptoms improved with addition of Toprol XL 12.5mg.     . Retired from [employer] as . Assessment/Plan:   - CHF vs. Pneumonia, ISRA[place], increased pBNP, but no peripheral edema  - Afib  - Increased troponin (peaked), no chest pain, no acute ST changes  - Anemia receiving tx  - Elderly     Poor candidate for invasive rx. Would start with medical Rx. Off BiPAP    Echo w/ EF 55-60%, mild AoS, mild to mod TR    Some confusion     - Holding xarelto, currently receiving tx  - Holding asa due to anemia  - Holding beta blocker due to orthostatic hypotension  - Cont Midodrine 10mg TID with hold parameters  - Currently on lasix 40mg iv bid         [x]       High complexity decision making was performed in this patient at high risk for decompensation with multiple organ involvement. Subjective:     Vj Ronquillo denies chest pain, dyspnea. Discussed with RN events overnight.      Review of Systems:    Symptom Y/N Comments  Symptom Y/N Comments   Fever/Chills N   Chest Pain N    Poor Appetite N   Edema N    Cough N   Abdominal Pain N    Sputum N   Joint Pain N    SOB/OSEGUERA N   Pruritis/Rash N Nausea/vomit N   Tolerating PT/OT Y    Diarrhea N   Tolerating Diet Y    Constipation N   Other       Could NOT obtain due to:      Objective:      Physical Exam:    Last 24hrs VS reviewed since prior progress note. Most recent are:    Visit Vitals  BP (!) 72/53 (BP 1 Location: Left upper arm, BP Patient Position: At rest)   Pulse 85   Temp 97.9 °F (36.6 °C)   Resp 25   Ht 6' 1\" (1.854 m)   Wt 67.6 kg (149 lb 1.6 oz)   SpO2 95%   BMI 19.67 kg/m²       Intake/Output Summary (Last 24 hours) at 3/22/2021 1456  Last data filed at 3/22/2021 5172  Gross per 24 hour   Intake 790 ml   Output 4100 ml   Net -3310 ml        General Appearance: Well developed, well nourished, alert & oriented x 3,    no acute distress. Ears/Nose/Mouth/Throat: Hearing grossly normal.  Neck: Supple. Chest: Lungs clear to auscultation bilaterally. Cardiovascular: Regular rate and rhythm, S1S2 normal, no murmur. Abdomen: Soft, non-tender, bowel sounds are active. Extremities: No edema bilaterally. Skin: Warm and dry. []         Post-cath site without hematoma, bruit, tenderness, or thrill. Distal pulses intact.     PMH/SH reviewed - no change compared to H&P    Data Review    Telemetry: paced     EKG:   [x]  No new EKG for review    Lab Data Personally Reviewed:    Recent Labs     03/22/21  0307 03/21/21  0743   WBC 7.9 8.6   HGB 8.3* 7.1*   HCT 26.5* 23.1*    194     Recent Labs     03/21/21  0743   INR 1.5*   PTP 15.4*   APTT 28.8      Recent Labs     03/22/21  0307 03/21/21  0743    142   K 2.4* 3.1*    108   CO2 30 26   BUN 53* 45*   CREA 2.01* 1.87*   GLU 98 99   CA 8.2* 8.1*   MG  --  2.6*     Recent Labs     03/21/21  1935 03/21/21  1207 03/21/21  0743   TROIQ 0.14* 0.24* 0.29*     Lab Results   Component Value Date/Time    Cholesterol, total 196 12/28/2012 04:00 AM    HDL Cholesterol 60 12/28/2012 04:00 AM    LDL, calculated 123 (H) 12/28/2012 04:00 AM    Triglyceride 65 12/28/2012 04:00 AM    CHOL/HDL Ratio 3.3 12/28/2012 04:00 AM       Recent Labs     03/21/21  0743   *   TP 7.6   ALB 2.8*   GLOB 4.8*     No results for input(s): PH, PCO2, PO2 in the last 72 hours.     Medications Personally Reviewed:    Current Facility-Administered Medications   Medication Dose Route Frequency    potassium chloride SR (KLOR-CON 10) tablet 40 mEq  40 mEq Oral Q8H    midodrine (PROAMATINE) tablet 10 mg  10 mg Oral TID PRN    potassium chloride 10 mEq in 50 ml IVPB  10 mEq IntraVENous Q1H    acetaminophen (TYLENOL) tablet 650 mg  650 mg Oral Q6H PRN    cefTRIAXone (ROCEPHIN) 1 g in 0.9% sodium chloride (MBP/ADV) 50 mL MBP  1 g IntraVENous Q24H    sodium chloride (NS) flush 5-40 mL  5-40 mL IntraVENous Q8H    sodium chloride (NS) flush 5-40 mL  5-40 mL IntraVENous PRN    acetaminophen (TYLENOL) tablet 650 mg  650 mg Oral Q6H PRN    Or    acetaminophen (TYLENOL) suppository 650 mg  650 mg Rectal Q6H PRN    polyethylene glycol (MIRALAX) packet 17 g  17 g Oral DAILY PRN    promethazine (PHENERGAN) tablet 12.5 mg  12.5 mg Oral Q6H PRN    Or    ondansetron (ZOFRAN) injection 4 mg  4 mg IntraVENous Q6H PRN    azithromycin (ZITHROMAX) 500 mg in 0.9% sodium chloride 250 mL (VIAL-MATE)  500 mg IntraVENous Q24H    furosemide (LASIX) injection 40 mg  40 mg IntraVENous BID    pantoprazole (PROTONIX) 40 mg in 0.9% sodium chloride 10 mL injection  40 mg IntraVENous Q12H         Tim Blanca III, DO

## 2021-03-22 NOTE — PROGRESS NOTES
Hospitalist Progress Note    NAME: Dinah Merino   :  1931   MRN:  [id number]       Assessment / Plan:  Acute hypoxic respiratory failure POA  Suspect acute systolic CHF/ PNA  Possible pneumonia  -Not on home O2  -Continue supplemental O2 to maintain sats > 92%  -Rapid Covid negative  -Flu negative and respiratory panel negative for tested viruses  -Continue diuresis with IV Lasix  -Strict I's/O, daily weights  -Keep Aguiar to monitor I's/O's for now  -proBNP 30 K on admission  -Troponin bump in setting of heart failure and [place]RA[place]  -Appreciate cardiology consult  -Continue antibiotic coverage with azithromycin and ceftriaxone  -EKG NSR ST/T wave abnormality possible inferior ischemia  -Poor candidate for invasive procedures  -Medical management for now as per cardiology  -Lower suspicion of bacterial pneumonia    TTE:  Normal systolic function, EF 31-96%    CXR:  Bilateral nonspecific pneumonia is new and superimposed on chronic calcified  pleural disease and interstitial lung disease. Recommend followup PA and lateral chest views in 8-10 weeks to ensure  Resolution.     Orthostatic hypotension  -Continue midodrine with holding parameters    Hypokalemia  -Replace, repeat a.m. labs    History of A. fib, on Xarelto  History of SSS, has a pacemaker  -Xarelto on hold due to anemia  -Continue metoprolol     Acute on chronic anemia  -Has been on iron infusion therapy previously  -Hold Xarelto  -Monitor CBC and transfuse as needed for Hb less than 7  -Continue IV PPI  -Appreciate GI consult  -Given patient frailty and tenuous respiratory status, thought procedures risk likely outweighs benefit and no overt signs of GI bleed currently     Elevated creatinine, 1.87  Unsure of what the baseline  Repeat BMP tomorrow, avoid nephrotoxic medication    Memory loss  Probable underlying dementia  -Patient agitated at points, given Zyprexa  -Close monitoring, fall precautions  -May need sitter     History of HLD  History of TIA       Code Status: DNR  Surrogate Decision Maker: Wife     DVT Prophylaxis: SCD  GI Prophylaxis: not indicated     Baseline: Ambulatory     Subjective:     Chief Complaint / Reason for Physician Visit  Patient cannot provide much history. Discussed care with patient's wife and daughter who are in the room during rounds. Respiratory status somewhat improved. Patient is on BiPAP and now on 5 LPM NC O2    Discussed with RN events overnight. Review of Systems:  Symptom Y/N Comments  Symptom Y/N Comments   Fever/Chills    Chest Pain     Poor Appetite    Edema     Cough    Abdominal Pain     Sputum    Joint Pain     SOB/OSEGUERA    Pruritis/Rash     Nausea/vomit    Tolerating PT/OT     Diarrhea    Tolerating Diet     Constipation    Other       Could NOT obtain due to:  AMS     Objective:     VITALS:   Last 24hrs VS reviewed since prior progress note.  Most recent are:  Patient Vitals for the past 24 hrs:   Temp Pulse Resp BP SpO2   03/22/21 0750 99.3 °F (37.4 °C) 73 30 116/75 95 %   03/22/21 0305 98.4 °F (36.9 °C) 79 18 124/75 97 %   03/21/21 2247 97.7 °F (36.5 °C) 93 20 112/69 93 %   03/21/21 1948 99 °F (37.2 °C) (!) 105 22 108/65 92 %   03/21/21 1845  (!) 108  125/87    03/21/21 1655 98.6 °F (37 °C) 83 24 (!) 142/90 96 %   03/21/21 1531     94 %   03/21/21 1345  91 23 130/75 98 %   03/21/21 1230 97.9 °F (36.6 °C) 95 21 130/64 96 %   03/21/21 1200 97.8 °F (36.6 °C) 80 16 100/78 99 %   03/21/21 1130 98 °F (36.7 °C) 71 22 (!) 150/74 99 %   03/21/21 1115 98 °F (36.7 °C) 77 21 (!) 137/105 98 %   03/21/21 1100 97.9 °F (36.6 °C) 67 18 139/74 100 %   03/21/21 1045 97.7 °F (36.5 °C) 70 18 123/85 100 %   03/21/21 1033 98 °F (36.7 °C) 80 14 128/71 96 %       Intake/Output Summary (Last 24 hours) at 3/22/2021 1000  Last data filed at 3/22/2021 1182  Gross per 24 hour   Intake 1007.5 ml   Output 5300 ml   Net -4292.5 ml        I had a face to face encounter and independently examined this patient on 3/22/2021, as outlined below:  PHYSICAL EXAM:  General: Elderly  adult male, no acute distress   EENT:  EOMI. Anicteric sclerae. MMM  Resp:  Diminished air entry bilateral lung hsieh, bibasilar Rales. No accessory muscle use  CV:  Regular  rhythm,  No edema  GI:  Soft, Non distended, Non tender. +Bowel sounds  Neurologic:  Limited exam, moves all extremities, follows some commands, oriented to person and family room  Psych: Moderately agitated mood, appropriate affect  Skin:  No rashes. No jaundice    Reviewed most current lab test results and cultures  YES  Reviewed most current radiology test results   YES  Review and summation of old records today    NO  Reviewed patient's current orders and MAR    YES  PMH/SH reviewed - no change compared to H&P  ________________________________________________________________________  Care Plan discussed with:    Comments   Patient x    Family  x    RN x    Care Manager x    Consultant                       x Multidiciplinary team rounds were held today with , nursing, pharmacist and clinical coordinator. Patient's plan of care was discussed; medications were reviewed and discharge planning was addressed. ________________________________________________________________________  Total NON critical care TIME: 35   Minutes    Total CRITICAL CARE TIME Spent:   Minutes non procedure based      Comments   >50% of visit spent in counseling and coordination of care x    ________________________________________________________________________  Sarah Winkler DO     Procedures: see electronic medical records for all procedures/Xrays and details which were not copied into this note but were reviewed prior to creation of Plan. LABS:  I reviewed today's most current labs and imaging studies.   Pertinent labs include:  Recent Labs     03/22/21  0307 03/21/21  0743   WBC 7.9 8.6   HGB 8.3* 7.1*   HCT 26.5* 23.1*    194     Recent Labs     03/22/21  0307 03/21/21  0743    142   K 2.4* 3.1*    108   CO2 30 26   GLU 98 99   BUN 53* 45*   CREA 2.01* 1.87*   CA 8.2* 8.1*   MG  --  2.6*   ALB  --  2.8*   TBILI  --  0.7   ALT  --  19   INR  --  1.5*       Signed: Bud Clock, DO

## 2021-03-22 NOTE — PROGRESS NOTES
GI PROGRESS NOTE  Lenora Elaine, REY  [phone] NP in-hospital cell phone M-F until 4:30  After 5pm or on weekends, please call  for physician on call    Issa Sorto :1931 GHE:[id number]   ATTG: Dr Marcos Crockett  PCP: Frank Glasgow MD  Date/Time:  3/22/2021 12:08 PM     Primary GI: Dr. Nargis Eaton - Dr Dipika Rutherford covering    Reason for following: anemia    Assessment:   Chronic Iron Deficiency Anemia - On Xarelto for Afib  Acute respiratory failure with hypoxia 2/2 pulmonary infiltrates  - FOBT + but reports 2 recent nose bleeds prior to admission  - No visible melena, hematochezia, or changes in stool color  - Chronically dark stools but on oral iron  - Baseline Hgb ~ 7 (2016) and today is 8.3, but spouse reports pt had Hgb of 11.1 in 2020.   - No abdominal pain, nausea, or vomiting - pt eating full tray during out visit  - EGD : 2017 : -Normal esophagus; biospied to exclude esophagitis -Normal stomach; biopsied to exclude gastritis -Normal duodenum    - CLN : 2017 : -Normal terminal ileum -Sigmoid diverticulosis -Single diminutive, benign-appearing 2mm sessile polyp in sigmoid colon at 45cm; removed with cold snare  -Single diminutive, benign-appearing 2mm sessile polyp in rectum at 20cm; removed with cold snare    Acute Renal Failure  Acute CHF  Hx SSS - pacemaker     Plan:   · No plan for endoscopic procedures at that time without overt bleeding and high risk for procedures also with hypoxia  · Continue BID PPI  · Serial H/H - transfuse PRN  · Hold xarelto for another day, if hgb stable, may be able to restart tomorrow  · Rest per primary team    Will continue to follow. Plan discussed with Dr Dipika Rutherford. Subjective:   Discussed with RN events overnight. No abdominal pain. No nausea. No complaints.      Complaint Y/N Description   Abdominal Pain n    Hematemesis n    Hematochezia n    Melena n    Constipation n    Diarrhea n    Dyspepsia n    Dysphagia n    Jaundiced n Nausea/vomiting n      Review of Systems:  Symptom Y/N Comments  Symptom Y/N Comments   Fever/Chills n   Chest Pain     Cough n   Headaches     Sputum n   Joint Pain     SOB/OSEGUERA n   Pruritis/Rash     Tolerating Diet y   Other       Could NOT obtain due to:      Objective:   VITALS:   Last 24hrs VS reviewed since prior progress note. Most recent are:  Visit Vitals  BP (!) 72/53 (BP 1 Location: Left upper arm, BP Patient Position: At rest)   Pulse 85   Temp 97.9 °F (36.6 °C)   Resp 25   Ht 6' 1\" (1.854 m)   Wt 67.6 kg (149 lb 1.6 oz)   SpO2 95%   BMI 19.67 kg/m²       Intake/Output Summary (Last 24 hours) at 3/22/2021 1208  Last data filed at 3/22/2021 1943  Gross per 24 hour   Intake 1007.5 ml   Output 5300 ml   Net -4292.5 ml     PHYSICAL EXAM:  General: WD, WN. Alert, cooperative, no acute distress    HEENT: NC, Atraumatic. Anicteric sclerae. Lungs:  CTA Bilaterally. No Wheezing/Rhonchi/Rales. Heart:  Regular  rhythm,  No murmur (-), No Rubs, No Gallops  Abdomen: Soft, Non distended, Non tender.  +Bowel sounds, no HSM  Extremities: Trace BLE edema. Neurologic:  Alert and oriented X self and place. No acute neurological distress   Psych:   Good insight. Not anxious nor agitated. Lab and Radiology Data Reviewed: (see below)    Medications Reviewed: (see below)  PMH/SH reviewed - no change compared to H&P  ________________________________________________________________________  Total time spent with patient: 20 minutes ________________________________________________________________________  Care Plan discussed with:  Patient y   Family  y - spouse and daughter   RN y              Consultant: Wesley Lopez NP     Procedures: see electronic medical records for all procedures/Xrays and details which were not copied into this note but were reviewed prior to creation of Plan.       LABS:  Recent Labs     03/22/21  0307 03/21/21  0743   WBC 7.9 8.6   HGB 8.3* 7.1*   HCT 26.5* 23.1*    194 Recent Labs     03/22/21  0307 03/21/21  0743    142   K 2.4* 3.1*    108   CO2 30 26   BUN 53* 45*   CREA 2.01* 1.87*   GLU 98 99   CA 8.2* 8.1*   MG  --  2.6*     Recent Labs     03/21/21  0743   *   TP 7.6   ALB 2.8*   GLOB 4.8*     Recent Labs     03/21/21  0743   INR 1.5*   PTP 15.4*   APTT 28.8      Recent Labs     03/21/21  0921   TIBC 211*   PSAT 10*   FERR 95      No results found for: FOL, RBCF  No results for input(s): PH, PCO2, PO2 in the last 72 hours. No results for input(s): CPK, CKMB in the last 72 hours.     No lab exists for component: TROPONINI  No results found for: COLOR, APPRN, SPGRU, REFSG, SAL, PROTU, GLUCU, KETU, BILU, UROU, SANA, LEUKU, GLUKE, EPSU, BACTU, WBCU, RBCU, CASTS, UCRY    MEDICATIONS:  Current Facility-Administered Medications   Medication Dose Route Frequency    potassium chloride SR (KLOR-CON 10) tablet 40 mEq  40 mEq Oral Q8H    midodrine (PROAMATINE) tablet 10 mg  10 mg Oral TID PRN    potassium chloride 10 mEq in 50 ml IVPB  10 mEq IntraVENous Q1H    acetaminophen (TYLENOL) tablet 650 mg  650 mg Oral Q6H PRN    cefTRIAXone (ROCEPHIN) 1 g in 0.9% sodium chloride (MBP/ADV) 50 mL MBP  1 g IntraVENous Q24H    sodium chloride (NS) flush 5-40 mL  5-40 mL IntraVENous Q8H    sodium chloride (NS) flush 5-40 mL  5-40 mL IntraVENous PRN    acetaminophen (TYLENOL) tablet 650 mg  650 mg Oral Q6H PRN    Or    acetaminophen (TYLENOL) suppository 650 mg  650 mg Rectal Q6H PRN    polyethylene glycol (MIRALAX) packet 17 g  17 g Oral DAILY PRN    promethazine (PHENERGAN) tablet 12.5 mg  12.5 mg Oral Q6H PRN    Or    ondansetron (ZOFRAN) injection 4 mg  4 mg IntraVENous Q6H PRN    azithromycin (ZITHROMAX) 500 mg in 0.9% sodium chloride 250 mL (VIAL-MATE)  500 mg IntraVENous Q24H    furosemide (LASIX) injection 40 mg  40 mg IntraVENous BID    pantoprazole (PROTONIX) 40 mg in 0.9% sodium chloride 10 mL injection  40 mg IntraVENous Q12H

## 2021-03-22 NOTE — PROGRESS NOTES
Transition of Care Plan:    RUR: 17%  Disposition: Home with HH vs SNF  Follow up appointments: PCP  DME needed: none  Transportation at Discharge: Wife or [facility] to transportation   Linwood Daunt or means to access home:   Lauren has access to home     IM Medicare letter:2nd IM at d/c  Caregiver Contact: Byron Marshall- [phone]  Discharge Caregiver contacted prior to discharge? CM to contact at d/c        Reason for Admission:  Acute hypoxic respiratory failure POA  Suspect acute systolic CHF/ PNA                     RUR Score:          7%           Plan for utilizing home health:      TBD    PCP: First and Last name:  Jerica Meraz MD     Name of Practice:    Are you a current patient: Yes/No:  Yes   Approximate date of last visit:  3/18/21   Can you participate in a virtual visit with your PCP:                     Current Advanced Directive/Advance Care Plan: DNR    Advance Care Planning     General Advance Care Planning (ACP) Conversation      Date of Conversation: 03/22/21  Conducted with: Patient with Decision Making Capacity and Healthcare Decision Maker: Next of Kin by law (only applies in absence of a Healthcare Power of  or Legal Guardian)    Healthcare Decision Maker:   Primary Decision Maker- Byron Marshall- [phone]    Today we documented Decision Maker(s) consistent with Legal Next of [address]. Content/Action Overview:    Has ACP document(s) NOT on file - requested patient to provide  Reviewed DNR/DNI and patient confirms current DNR status - completed forms on file (place new order if needed)    Length of Voluntary ACP Conversation in minutes:  20 minutes    Cristina Lawson RN        Healthcare Decision Maker:   Click here to complete [place] including selection of the [place] Relationship (ie \"Primary\")           Primary Decision Maker- Joana Carlos [phone]                  Transition of Care Plan: Disposition : Home with HH vs SNF    CM introduced self and role to wife. CM verified with wife , pt demographics, insurance info and emergency contact. Pt lives with wife in 2 story home with 14 steps to upper level. Pt was independent with ADL but recently has required assistance in last 4 days prior to admission, ambulates with walker or cane and wife provides transportation. DME: cane, walker, transport chair and stair lift  HH: in the past- could not recall name  SNF: none in the past  Pharmacy: [facility] in [place] and [address] [facility]    \Care Management Interventions  PCP Verified by CM:  Yes  Transition of Care Consult (CM Consult): Discharge Planning  Discharge Durable Medical Equipment: No(cane,walker, stairlift and transport chair)  Current Support Network: Lives with Spouse  Confirm Follow Up Transport: [address]  Phone: [phone]

## 2021-03-23 LAB
ANION GAP SERPL CALC-SCNC: 6 MMOL/L (ref 5–15)
BASOPHILS # BLD: 0 K/UL (ref 0–0.1)
BASOPHILS NFR BLD: 0 % (ref 0–1)
BUN SERPL-MCNC: 51 MG/DL (ref 6–20)
BUN/CREAT SERPL: 26 (ref 12–20)
CALCIUM SERPL-MCNC: 8.3 MG/DL (ref 8.5–10.1)
CHLORIDE SERPL-SCNC: 105 MMOL/L (ref 97–108)
CO2 SERPL-SCNC: 31 MMOL/L (ref 21–32)
CREAT SERPL-MCNC: 1.93 MG/DL (ref 0.7–1.3)
DIFFERENTIAL METHOD BLD: ABNORMAL
EOSINOPHIL # BLD: 0.2 K/UL (ref 0–0.4)
EOSINOPHIL NFR BLD: 2 % (ref 0–7)
ERYTHROCYTE [DISTWIDTH] IN BLOOD BY AUTOMATED COUNT: 15.4 % (ref 11.5–14.5)
GLUCOSE SERPL-MCNC: 105 MG/DL (ref 65–100)
HCT VFR BLD AUTO: 27.7 % (ref 36.6–50.3)
HGB BLD-MCNC: 8.8 G/DL (ref 12.1–17)
IMM GRANULOCYTES # BLD AUTO: 0.1 K/UL (ref 0–0.04)
IMM GRANULOCYTES NFR BLD AUTO: 1 % (ref 0–0.5)
LYMPHOCYTES # BLD: 1.1 K/UL (ref 0.8–3.5)
LYMPHOCYTES NFR BLD: 15 % (ref 12–49)
MCH RBC QN AUTO: 30.4 PG (ref 26–34)
MCHC RBC AUTO-ENTMCNC: 31.8 G/DL (ref 30–36.5)
MCV RBC AUTO: 95.8 FL (ref 80–99)
MONOCYTES # BLD: 0.7 K/UL (ref 0–1)
MONOCYTES NFR BLD: 10 % (ref 5–13)
NEUTS SEG # BLD: 5.5 K/UL (ref 1.8–8)
NEUTS SEG NFR BLD: 72 % (ref 32–75)
NRBC # BLD: 0.04 K/UL (ref 0–0.01)
NRBC BLD-RTO: 0.5 PER 100 WBC
PLATELET # BLD AUTO: 145 K/UL (ref 150–400)
PMV BLD AUTO: 9.2 FL (ref 8.9–12.9)
POTASSIUM SERPL-SCNC: 3.2 MMOL/L (ref 3.5–5.1)
RBC # BLD AUTO: 2.89 M/UL (ref 4.1–5.7)
SODIUM SERPL-SCNC: 142 MMOL/L (ref 136–145)
WBC # BLD AUTO: 7.6 K/UL (ref 4.1–11.1)

## 2021-03-23 PROCEDURE — 36415 COLL VENOUS BLD VENIPUNCTURE: CPT

## 2021-03-23 PROCEDURE — 74011250636 HC RX REV CODE- 250/636: Performed by: NURSE PRACTITIONER

## 2021-03-23 PROCEDURE — 2709999900 HC NON-CHARGEABLE SUPPLY

## 2021-03-23 PROCEDURE — 74011000258 HC RX REV CODE- 258: Performed by: INTERNAL MEDICINE

## 2021-03-23 PROCEDURE — 85025 COMPLETE CBC W/AUTO DIFF WBC: CPT

## 2021-03-23 PROCEDURE — 74011250636 HC RX REV CODE- 250/636: Performed by: GENERAL ACUTE CARE HOSPITAL

## 2021-03-23 PROCEDURE — C9113 INJ PANTOPRAZOLE SODIUM, VIA: HCPCS | Performed by: INTERNAL MEDICINE

## 2021-03-23 PROCEDURE — 74011250636 HC RX REV CODE- 250/636: Performed by: INTERNAL MEDICINE

## 2021-03-23 PROCEDURE — 77010033678 HC OXYGEN DAILY

## 2021-03-23 PROCEDURE — 80048 BASIC METABOLIC PNL TOTAL CA: CPT

## 2021-03-23 PROCEDURE — 74011250637 HC RX REV CODE- 250/637: Performed by: GENERAL ACUTE CARE HOSPITAL

## 2021-03-23 PROCEDURE — 74011250637 HC RX REV CODE- 250/637: Performed by: INTERNAL MEDICINE

## 2021-03-23 PROCEDURE — 74011000250 HC RX REV CODE- 250: Performed by: INTERNAL MEDICINE

## 2021-03-23 PROCEDURE — 97535 SELF CARE MNGMENT TRAINING: CPT | Performed by: OCCUPATIONAL THERAPIST

## 2021-03-23 PROCEDURE — 77030040831 HC BAG URINE DRNG MDII -A

## 2021-03-23 PROCEDURE — 97530 THERAPEUTIC ACTIVITIES: CPT | Performed by: OCCUPATIONAL THERAPIST

## 2021-03-23 PROCEDURE — 65660000001 HC RM ICU INTERMED STEPDOWN

## 2021-03-23 RX ORDER — PANTOPRAZOLE SODIUM 40 MG/1
40 TABLET, DELAYED RELEASE ORAL
Status: DISCONTINUED | OUTPATIENT
Start: 2021-03-23 | End: 2021-03-26 | Stop reason: HOSPADM

## 2021-03-23 RX ORDER — LANOLIN ALCOHOL/MO/W.PET/CERES
1 CREAM (GRAM) TOPICAL
Status: DISCONTINUED | OUTPATIENT
Start: 2021-03-24 | End: 2021-03-26 | Stop reason: HOSPADM

## 2021-03-23 RX ORDER — POTASSIUM CHLORIDE 750 MG/1
40 TABLET, FILM COATED, EXTENDED RELEASE ORAL DAILY
Status: DISCONTINUED | OUTPATIENT
Start: 2021-03-23 | End: 2021-03-26 | Stop reason: HOSPADM

## 2021-03-23 RX ADMIN — PANTOPRAZOLE SODIUM 40 MG: 40 TABLET, DELAYED RELEASE ORAL at 18:25

## 2021-03-23 RX ADMIN — POTASSIUM CHLORIDE 40 MEQ: 750 TABLET, FILM COATED, EXTENDED RELEASE ORAL at 12:27

## 2021-03-23 RX ADMIN — CEFTRIAXONE 1 G: 1 INJECTION, POWDER, FOR SOLUTION INTRAMUSCULAR; INTRAVENOUS at 08:50

## 2021-03-23 RX ADMIN — FUROSEMIDE 40 MG: 10 INJECTION, SOLUTION INTRAMUSCULAR; INTRAVENOUS at 19:23

## 2021-03-23 RX ADMIN — SODIUM CHLORIDE 40 MG: 9 INJECTION, SOLUTION INTRAMUSCULAR; INTRAVENOUS; SUBCUTANEOUS at 09:03

## 2021-03-23 RX ADMIN — POTASSIUM CHLORIDE 10 MEQ: 10 INJECTION, SOLUTION INTRAVENOUS at 01:07

## 2021-03-23 RX ADMIN — POTASSIUM CHLORIDE 10 MEQ: 10 INJECTION, SOLUTION INTRAVENOUS at 00:29

## 2021-03-23 RX ADMIN — POLYETHYLENE GLYCOL 3350 17 G: 17 POWDER, FOR SOLUTION ORAL at 12:50

## 2021-03-23 RX ADMIN — AZITHROMYCIN MONOHYDRATE 500 MG: 500 INJECTION, POWDER, LYOPHILIZED, FOR SOLUTION INTRAVENOUS at 10:36

## 2021-03-23 RX ADMIN — POTASSIUM CHLORIDE 10 MEQ: 10 INJECTION, SOLUTION INTRAVENOUS at 02:06

## 2021-03-23 RX ADMIN — IRON SUCROSE 100 MG: 20 INJECTION, SOLUTION INTRAVENOUS at 16:48

## 2021-03-23 RX ADMIN — FUROSEMIDE 40 MG: 10 INJECTION, SOLUTION INTRAMUSCULAR; INTRAVENOUS at 09:03

## 2021-03-23 RX ADMIN — Medication 10 ML: at 16:48

## 2021-03-23 NOTE — PROGRESS NOTES
Called by RN at [phone] to see patient for afib rvr and agitation stat. Elizabeth Almaguer is a 80 y.o. WHITE male admitted for acute hypoxic toxic respiratory failure suspected CHF pneumonia. He was ruled out for Covid infection. Upon entering the room the patient is noted to be supine in bed very restless. Nurse is at bedside. Of note is that I had received a message in regards to patient low potassium of 2.7 earlier and I had ordered potassium chloride 10 mEq IV x 5 runs. This is infusing at this time. Blood pressure stable with heart rate between 90 and 120 at this time. Patient's nurse reports that patient becomes very agitated and pulls at his lines-IVs and Aguiar catheter. States patient was given Atarax last night which was very effective.            No Known Allergies     Current Facility-Administered Medications   Medication Dose Route Frequency    midodrine (PROAMATINE) tablet 10 mg  10 mg Oral TID PRN    potassium chloride 10 mEq in 100 ml IVPB  10 mEq IntraVENous Q1H    acetaminophen (TYLENOL) tablet 650 mg  650 mg Oral Q6H PRN    cefTRIAXone (ROCEPHIN) 1 g in 0.9% sodium chloride (MBP/ADV) 50 mL MBP  1 g IntraVENous Q24H    sodium chloride (NS) flush 5-40 mL  5-40 mL IntraVENous Q8H    sodium chloride (NS) flush 5-40 mL  5-40 mL IntraVENous PRN    acetaminophen (TYLENOL) tablet 650 mg  650 mg Oral Q6H PRN    Or    acetaminophen (TYLENOL) suppository 650 mg  650 mg Rectal Q6H PRN    polyethylene glycol (MIRALAX) packet 17 g  17 g Oral DAILY PRN    promethazine (PHENERGAN) tablet 12.5 mg  12.5 mg Oral Q6H PRN    Or    ondansetron (ZOFRAN) injection 4 mg  4 mg IntraVENous Q6H PRN    azithromycin (ZITHROMAX) 500 mg in 0.9% sodium chloride 250 mL (VIAL-MATE)  500 mg IntraVENous Q24H    furosemide (LASIX) injection 40 mg  40 mg IntraVENous BID    pantoprazole (PROTONIX) 40 mg in 0.9% sodium chloride 10 mL injection  40 mg IntraVENous Q12H          Visit Vitals  BP (!) 116/92 (BP 1 Location: Left upper arm, BP Patient Position: At rest)   Pulse 96   Temp 97.4 °F (36.3 °C)   Resp 18   Ht 6' 1\" (1.854 m)   Wt 67.6 kg (149 lb 1.6 oz)   SpO2 97%   BMI 19.67 kg/m²          Review of Systems   Unable to perform ROS: Mental status change          Physical Exam  Cardiovascular:      Rate and Rhythm: Rhythm irregularly irregular. Pulmonary:      Effort: Pulmonary effort is normal.   Abdominal:      Palpations: Abdomen is soft. Genitourinary:     Comments: Aguiar in place  Skin:     General: Skin is warm and dry. Neurological:      Mental Status: He is alert. He is disoriented. Pertinent Recent Labs and Xrays:    LAB DATA REVIEWED:    Recent Results (from the past 24 hour(s))   CBC WITH AUTOMATED DIFF    Collection Time: 03/22/21  3:07 AM   Result Value Ref Range    WBC 7.9 4.1 - 11.1 K/uL    RBC 2.81 (L) 4.10 - 5.70 M/uL    HGB 8.3 (L) 12.1 - 17.0 g/dL    HCT 26.5 (L) 36.6 - 50.3 %    MCV 94.3 80.0 - 99.0 FL    MCH 29.5 26.0 - 34.0 PG    MCHC 31.3 30.0 - 36.5 g/dL    RDW 16.0 (H) 11.5 - 14.5 %    PLATELET 541 795 - 521 K/uL    MPV 9.7 8.9 - 12.9 FL    NRBC 1.4 (H) 0  WBC    ABSOLUTE NRBC 0.11 (H) 0.00 - 0.01 K/uL    NEUTROPHILS 74 32 - 75 %    LYMPHOCYTES 16 12 - 49 %    MONOCYTES 8 5 - 13 %    EOSINOPHILS 1 0 - 7 %    BASOPHILS 0 0 - 1 %    IMMATURE GRANULOCYTES 1 (H) 0.0 - 0.5 %    ABS. NEUTROPHILS 5.8 1.8 - 8.0 K/UL    ABS. LYMPHOCYTES 1.3 0.8 - 3.5 K/UL    ABS. MONOCYTES 0.6 0.0 - 1.0 K/UL    ABS. EOSINOPHILS 0.1 0.0 - 0.4 K/UL    ABS. BASOPHILS 0.0 0.0 - 0.1 K/UL    ABS. IMM.  GRANS. 0.1 (H) 0.00 - 0.04 K/UL    DF AUTOMATED     METABOLIC PANEL, BASIC    Collection Time: 03/22/21  3:07 AM   Result Value Ref Range    Sodium 140 136 - 145 mmol/L    Potassium 2.4 (LL) 3.5 - 5.1 mmol/L    Chloride 102 97 - 108 mmol/L    CO2 30 21 - 32 mmol/L    Anion gap 8 5 - 15 mmol/L    Glucose 98 65 - 100 mg/dL    BUN 53 (H) 6 - 20 MG/DL    Creatinine 2.01 (H) 0.70 - 1.30 MG/DL BUN/Creatinine ratio 26 (H) 12 - 20      GFR est AA 38 (L) >60 ml/min/1.73m2    GFR est non-AA 31 (L) >60 ml/min/1.73m2    Calcium 8.2 (L) 8.5 - 10.1 MG/DL   NT-PRO BNP    Collection Time: 03/22/21  3:07 AM   Result Value Ref Range    NT pro-BNP 28,586 (H) <450 PG/ML   POTASSIUM    Collection Time: 03/22/21  8:00 PM   Result Value Ref Range    Potassium 2.7 (LL) 3.5 - 5.1 mmol/L        Recent Labs     03/22/21  0307 03/21/21  0743   WBC 7.9 8.6   HGB 8.3* 7.1*   HCT 26.5* 23.1*    194     Recent Labs     03/22/21 2000 03/22/21  0307 03/21/21  0743   NA  --  140 142   K 2.7* 2.4* 3.1*   CL  --  102 108   CO2  --  30 26   BUN  --  53* 45*   CREA  --  2.01* 1.87*   GLU  --  98 99   CA  --  8.2* 8.1*   MG  --   --  2.6*     Recent Labs     03/21/21  0743   INR 1.5*     No results for input(s): PH, PCO2, PO2 in the last 72 hours. Recent Labs     03/21/21  0742   PHI 7.52*   PO2I 36*   PCO2I 33.7*     Recent Labs     03/21/21  1935 03/21/21  1207   TROIQ 0.14* 0.24*     Lab Results   Component Value Date/Time    Glucose (POC) 98 12/27/2012 07:56 PM          Recent Imaging studies(If Any)    CXR Results  (Last 48 hours)               03/22/21 1116  XR CHEST PORT Final result    Impression:  No significant interval change. Narrative:  Clinical history: follow up edema/PNA   INDICATION:   follow up edema/PNA   COMPARISON: 3/21/2021       FINDINGS:   AP portable upright view of the chest demonstrates a stable  cardiopericardial   silhouette. Extensive chronic parenchymal and pleural opacities. Increased   interstitial opacity. .Chronic atelectatic change. .There is no pneumothorax. .   Patient is on a cardiac monitor. Radiopaque pacer. 03/21/21 0748  XR CHEST PORT Final result    Impression:      Bilateral nonspecific pneumonia is new and superimposed on chronic calcified   pleural disease and interstitial lung disease.    Recommend followup PA and lateral chest views in 8-10 weeks to ensure resolution. Narrative:  EXAM: XR CHEST PORT       INDICATION: Shortness of breath, respiratory distress, normal white blood cell   count. COVID19 test pending. COMPARISON: Chest views on 12/7/2016. CT chest on 2/20/2017. TECHNIQUE: Upright portable chest AP view       FINDINGS: Cardiac pacemaker battery pack and leads are unchanged. Cardiac   monitoring wires overlie the thorax. New mild cardiac silhouette enlargement may   be accentuated by technique. Aortic arch is atherosclerotic but not enlarged. The pulmonary vasculature is within normal limits. Right upper lobe and left perihilar airspace opacities are new. Pleural   calcifications are unchanged. No pneumothorax. No evidence of pleural effusion. Bones are osteopenic. Echo Results  (Last 48 hours)    None           CT Results  (Last 48 hours)    None           EKG RESULTS     Procedure Component Value Units Date/Time    EKG 12 LEAD INITIAL [[id number]] Collected: 03/21/21 0800    Order Status: Completed Updated: 03/22/21 1023     Ventricular Rate 64 BPM      Atrial Rate 64 BPM      P-R Interval 154 ms      QRS Duration 90 ms      Q-T Interval 498 ms      QTC Calculation (Bezet) 513 ms      Calculated R Axis -11 degrees      Calculated T Axis -55 degrees      Diagnosis --     Normal sinus rhythm  ST & T wave abnormality, consider anterior ischemia  When compared with ECG of 07-DEC-2016 15:32,  Sinus rhythm has replaced Electronic atrial pacemaker  Non-specific change in ST segment in Anterior leads  Nonspecific T wave abnormality, worse in Inferior leads  T wave inversion now evident in Anterior leads  QT has lengthened  Confirmed by Yaron Fremont ([id number]) on 3/22/2021 10:22:56 AM             03/21/21   ECHO ADULT COMPLETE 03/21/2021 3/21/2021    Narrative · LV: Estimated LVEF is 55 - 60%. Normal cavity size, wall thickness and   systolic function (ejection fraction normal).   · AV: Aortic valve leaflet calcification present. Mild aortic valve   stenosis is present. · TV: Mild to moderate tricuspid valve regurgitation is present. · PA: Pulmonary arterial systolic pressure is 41 mmHg. Signed by: Clarence Lao MD          Recent Microbiology Data(If Any)  . All Micro Results     Procedure Component Value Units Date/Time    CULTURE, BLOOD, PERIPHERAL [[id number]] Collected: 03/21/21 0743    Order Status: Completed Specimen: Blood Updated: 03/22/21 0724     Special Requests: NO SPECIAL REQUESTS        Culture result: NO GROWTH AFTER 22 HOURS       RESPIRATORY VIRUS PANEL W/COVID-19, PCR [[id number]] Collected: 03/21/21 0033    Order Status: Completed Specimen: Nasopharyngeal Updated: 03/21/21 1316     Adenovirus Not detected        Coronavirus 229E Not detected        Coronavirus HKU1 Not detected        Coronavirus CVNL63 Not detected        Coronavirus OC43 Not detected        Metapneumovirus Not detected        Rhinovirus and Enterovirus Not detected        Influenza A Not detected        Influenza A, subtype H1 Not detected        Influenza A, subtype H3 Not detected        INFLUENZA A H1N1 PCR Not detected        Influenza B Not detected        Parainfluenza 1 Not detected        Parainfluenza 2 Not detected        Parainfluenza 3 Not detected        Parainfluenza virus 4 Not detected        RSV by PCR Not detected        B. parapertussis, PCR Not detected        Bordetella pertussis - PCR Not detected        Chlamydophila pneumoniae DNA, QL, PCR Not detected        Mycoplasma pneumoniae DNA, QL, PCR Not detected        SARS-CoV-2, PCR Not detected       COVID-19 RAPID TEST [[id number]] Collected: 03/21/21 7390    Order Status: Completed Specimen: Nasopharyngeal Updated: 03/21/21 0904     Specimen source Nasopharyngeal        COVID-19 rapid test Not detected        Comment: Rapid Abbott ID Now       Rapid NAAT:  The specimen is NEGATIVE for SARS-CoV-2, the novel coronavirus associated with COVID-19. Negative results should be treated as presumptive and, if inconsistent with clinical signs and symptoms or necessary for patient management, should be tested with an alternative molecular assay. Negative results do not preclude SARS-CoV-2 infection and should not be used as the sole basis for patient management decisions. This test has been authorized by the FDA under an Emergency Use Authorization (EUA) for use by authorized laboratories. Fact sheet for Healthcare Providers: ConventionChangeYourFlightdate.co.nz  Fact sheet for Patients: LiveQoS.co.nz       Methodology: Isothermal Nucleic Acid Amplification                    Assessment, action, and plan  afib wth RVR    · Heart rate currently between 90 and 120 so will hold off on rate reducing meds for now  · Will give atarax again as it reportedly helped last night - atarax 25 mg po x 1  ·  as needed  · CBC and BMP in am  · Continue to monitor  · Has cardiology on board - beta blockers were held due to orthostatic hypotension           Signed by:  Burton Anne DNP, ELEAZAR    Critical care time unrelated to prior notes: 35 minutes    Please note that this note was dictated using Dragon computer voice recognition software. Quite often unanticipated grammatical, syntax, homophones, and other interpretive errors are inadvertently transcribed by the computer software. Please disregard these errors. Please excuse any errors that have escaped final proofreading.

## 2021-03-23 NOTE — PROGRESS NOTES
0730 Bedside shift change report given to [facility] (oncoming nurse) by Ernesto Lennox (offgoing nurse). Report included the following information SBAR.     1200  Verified with MD to remove aponte. Will place condom catheter to monitor I&Os. 1400  Aponte catheter removed.

## 2021-03-23 NOTE — PROGRESS NOTES
Problem: Self Care Deficits Care Plan (Adult)  Goal: *Acute Goals and Plan of Care (Insert Text)  Description: FUNCTIONAL STATUS PRIOR TO ADMISSION: Patient's wife indicates that he was mostly able to manage basic self-care, but she has been assisting more than usual over the past week. Patient does have some dementia (per daughter). Uses a cane for functional mobility. HOME SUPPORT: Lives with wife. Supportive daughter. Occupational Therapy Goals  Initiated 3/22/2021  1. Patient will perform grooming standing at the sink with minimal assistance/contact guard assist within 7 day(s). 2.  Patient will perform upper body dressing with supervision and cues within 7 day(s). 3.  Patient will perform lower body dressing with minimal assistance/contact guard assist within 7 day(s). 4.  Patient will perform toilet transfers with minimal assistance within 7 day(s). 5.  Patient will perform all aspects of toileting with minimal assistance within 7 day(s). Outcome: Progressing Towards Goal    OCCUPATIONAL THERAPY TREATMENT  Patient: Joao Go (16 y.o. male)  Date: 3/23/2021  Diagnosis: Acute respiratory failure with hypoxia ([facility] [address] [J96.01] <principal problem not specified>       Precautions: Fall  Chart, occupational therapy assessment, plan of care, and goals were reviewed. ASSESSMENT  Patient found to be orthostatic with standing during this session, with a BP of 74/49 and patient complaint of suddenly feeling fatigued. He was cooperative t/o session, but requires cueing for attention, sequencing and problem solving during ADLs. Safety cues also needed during transfers, with patient's decreased safety awareness being magnified by onset of hypotension and resultant decline in mental status when standing. Overall he was min A for bed mobility, he was mod A for transfers and he was supervision to mod A for all ADLs performed.  Patient with a posterior lean preference in standing and when performing seated ADLs. No major LOB during seated ADLs, but he requires constant support when in standing. Upon completion of session patients's BP was found to be stable (98/61) seated up in chair. At this time he continues to benefit from acute OT and will need SNF rehab after discharge. PLAN :  Patient continues to benefit from skilled intervention to address the above impairments. Continue treatment per established plan of care. to address goals. Recommendation for discharge: (in order for the patient to meet his/her long term goals)  Therapy up to 5 days/week in SNF setting           OBJECTIVE DATA SUMMARY:   Cognitive/Behavioral Status:  Neurologic State: Alert;Confused  Orientation Level: Oriented to person;Disoriented to place; Disoriented to situation;Disoriented to time  Cognition: Decreased attention/concentration; Impaired decision making; Impulsive;Memory loss;Poor safety awareness     Functional Mobility and Transfers for ADLs:  Bed Mobility:  Rolling: Minimum assistance  Supine to Sit: Minimum assistance  Scooting: Contact guard assistance    Transfers:  Sit to Stand: Moderate assistance     Bed to Chair: Moderate assistance;Assist x1(taking steps with RW, leaning posteriorly )    Balance:  Sitting: Impaired  Sitting - Static: Good (unsupported)  Sitting - Dynamic: Fair (occasional)  Standing: Impaired; With support  Standing - Static: Poor  Standing - Dynamic : Poor    ADL Intervention:  Grooming  Position Performed: Seated in chair  Washing Hands: Set-up; Supervision    Upper Body [address] with hospital gown: Moderate assistance; Compensatory technique training  Cues: Tactile cues provided;Verbal cues provided;Visual cues provided    Lower Body Dressing Assistance  Socks:  Moderate assistance(for L sock, min A for R sock)  Cues: Don;Tactile cues provided;Verbal cues provided;Visual cues provided    Cognitive Retraining  Problem Solving: Deductive reason;General alternative solution; Identifying the problem  Organizing/Sequencing: Breaking task down;Prioritizing; Two step sequence  Attention to Task: Distractibility; Single task  Following Commands:  Follows one step commands/directions  Cues: Tactile cues provided;Visual cues provided;Verbal cues provided    Pain:  Chronic back pain    Activity Tolerance:   Poor   Seated BP: 103/59  Standing BP: 74/49  Seated BP:96/61  Seated BP after 3 minutes: 98/61      After treatment patient left in no apparent distress:   Sitting in chair, Call bell within reach, and Caregiver / family present    COMMUNICATION/COLLABORATION:   The patients plan of care was discussed with: Physical Therapist, Registered Nurse, and     Robinson Diez OTR/L  Time Calculation: 40 mins

## 2021-03-23 NOTE — PROGRESS NOTES
03/22/21 1141   Vitals   Temp 97.9 °F (36.6 °C)   Temp Source Axillary   Pulse (Heart Rate) 85   Heart Rate Source Monitor   Resp Rate 25   O2 Sat (%) 95 %   Level of Consciousness Alert   BP (!) 72/53   MAP (Calculated) (!) 59   BP 1 Location Left upper arm   BP 1 Method Automatic   BP Patient Position At rest   MEWS Score 4   Patient has PRN midodrine for BP, administered with no further orders.

## 2021-03-23 NOTE — PROGRESS NOTES
Comprehensive Nutrition Assessment    Type and Reason for Visit: Initial(low BMI)    Nutrition Recommendations/Plan:   · Continue cardiac, mechanical soft diet  · Recommend ONS. Wife declines Ensure d/t whey protein. Will try plant based evolve shakes from the cafe  · Please document % meals and supplements consumed in flowsheet I/O's under intake   · Recommend daily bowel regimen until BM given reports of constipation x1wk    Nutrition Assessment:      Chart reviewed for low BMI. Pt noted for acute hypoxic respiratory failure, acute CHF, PNA, hypokalemia, afib, chronic anemia, memory loss / possible underlying dementia. Pt's wife present at time of visit and pt unable to answer many questions for assessment. Wife reports pt had been eating less PTA, but since admission he has been eating everything on his trays. He has been seeing a naturopathic doctor since 2017 to treat his gastritis which had improved until recently. They have him on MVI, D3, aloe vera juice, iron SAP, and vit B complex supplements. She reports food sensitivity test found him intolerant of potatoes. They also found he does not tolerate dairy and grains, but only when consumed at the same time? She has thus cut these out of their diets completely. He also may have had a reaction to pea protein powder she put in his smoothies? Wife reports his usual weight recently has been in the 150s lb, believes he has lost weight but cannot specify how much or over what span of time. No recent wt hx in EMR. She also reports increased weakness. Pt presents with fat and muscle wasting, meeting ASPEN criteria for moderate malnutrition. Wife declined Ensure shakes d/t whey protein content. Will try Evolve plant based shakes from the cafe while in house. RD recommended they continue some ONS at home after d/c to promote adequate protein & kcal intake. Wife also reports pt has been constipated for about a week.    Wt Readings from Last 5 Encounters:   03/23/21 66.5 kg (146 lb 11.2 oz)   12/16/18 76 kg (167 lb 8.8 oz)   02/09/17 78.6 kg (173 lb 5 oz)   11/03/16 74.8 kg (165 lb)   12/31/12 75.8 kg (167 lb)   ]    Malnutrition Assessment:  Malnutrition Status: Moderate malnutrition    Context:  Acute illness     Findings of the 6 clinical characteristics of malnutrition:   Energy Intake:  1 - 75% or less of est energy req for 7 or more days  Weight Loss:  Unable to assess     Body Fat Loss:  7 - Moderate body fat loss, Orbital, Buccal region   Muscle Mass Loss:  1 - Mild muscle mass loss, Hand (interosseous), Scapula (trapezius), Clavicles (pectoralis & deltoids)  Fluid Accumulation:  No significant fluid accumulation,     Strength:  Not performed(Wife reports decreased strength)         Estimated Daily Nutrient Needs:  Energy (kcal): 2049 kcal (BMR x 1.3AF + 250); Weight Used for Energy Requirements: Current  Protein (g): 67-80g (1-1.2g/kg); Weight Used for Protein Requirements: Current  Fluid (ml/day): 2000mL; Method Used for Fluid Requirements: 1 ml/kcal      Nutrition Related Findings:  Labs: K 3.2. Meds: zithromax, rocephin, lasix, iron sucrose, protonix, miralax, klorcon, KCl (IV). BM PTA.       Wounds:    None       Current Nutrition Therapies:  DIET CARDIAC Mechanical Soft  DIET ONE TIME MESSAGE    Anthropometric Measures:  · Height:  6' 1\" (185.4 cm)  · Current Body Wt:  66.5 kg (146 lb 9.7 oz)   · Ideal Body Wt:  184 lbs:  79.7 %   · BMI Category:  Underweight (BMI less than 22) age over 72       Nutrition Diagnosis:   · Inadequate protein-energy intake related to (decreased appetite PTA) as evidenced by poor intake prior to admission      Nutrition Interventions:   Food and/or Nutrient Delivery: Continue current diet, Start oral nutrition supplement  Nutrition Education and Counseling: No recommendations at this time  Coordination of Nutrition Care: Continue to monitor while inpatient    Goals:  Continue PO intake >75% meals and ONS next 5-7 days       Nutrition Monitoring and Evaluation:   Behavioral-Environmental Outcomes: None identified  Food/Nutrient Intake Outcomes: Food and nutrient intake, Supplement intake  Physical Signs/Symptoms Outcomes: Biochemical data, Weight, Nutrition focused physical findings, GI status    Discharge Planning:    Continue current diet, Continue oral nutrition supplement     Electronically signed by Sabrina Ventura RD on 3/23/2021 at 3:41 PM    Contact: [id number]  Pager [phone]

## 2021-03-23 NOTE — PROGRESS NOTES
Progress Note      3/23/2021 2:56 PM  NAME: Gladys Qureshi   MRN:  [id number]   Admit Diagnosis: Acute respiratory failure with hypoxia [facility]) [J96.01]      Problem List:   3/21:  Presents with hypoxemic resp failure with bilateral infiltrates, on bipap anemia with Hg 7.1, increas trop 0.25        11/20:  Doing well; intermittent use of midodrine. No CP or SOB. Wife says his Alisa Narvaez" is worse. No palps. 5/20 (VV): Doing well. BPs better since last OV and needs midodrine less often. Takes maybe three times per week. Mild intermittent OSEGUERA is unchanged. No CP, syncope, falls or edema. 10/19:  BPs have been lower. He has a compression fx now. Memory loss and confusion becoming an issue. Dermatologist said he has poor circulation in his feet. 4/19: Follow up after adding midodrine. Isn't needing the med every day, but dizziness has improved since starting it. No CP, SOB, palpitations, edema or syncope. Foot infection is improving. 3/19:  Dizzy and LH when standing up or changing positions for the past few weeks. No syncope. No CP, SOB, orthopnea, PND, JULIAN. Gets leg cramps in the AM.  Concerned about an infection on his feet. 9/18: Doing ok. Recently diagnosed with 2 compression fractures in his thoracic spine. Also continues to have trouble with memory. Chronic SOB is stable. No lightheadedness, CP, palpitations. Goes to PT twice per week, walks dog daily. Weight down 13 lbs.  3/18: Some SOB with exertion in the cold weather, but overall doing \"very well\". He can go up and down the stairs without any issues. No CP, palpitations, syncope, edema. ASA stopped January d/t gastritis and + hemeoccult. 9/17:  Doing ok; Hgb better; seeing heme next month; inc OSEGUERA but no CP. No bleeding. Seeing naturopath for reflux. 3/17:  Doing well. Had EGD/cscope with esophagitis/gastritis. Also with chest CTA indicating moderate coronary Ca++. Remains without exertional symptoms. Active. No complaints.   GI wants to start PPI.  12/16:  feels SBO and dizzy, but not as bad as he has in the past.  Hgb recently 7.4 and 8.3 (on xarelto). Says he recently had a CXR with flui on his lungs. No orthopnea or JULIAN. No PND. Does have a NP cough. No syncope. No CP. Getting O2 at home from his PCP        Problems:  1. SSS. History of bradycardia. s/p PPM.  2. Atrial Fibrillation. Did not tolerate cardizem due to SSS. On Xarelto. He has been in and out of a fib over last few years by device check. 3. Mild bilateral carotid disease. His echo shows normal LV EF and carotids show mild disease in right and left carotids. 4. TIA. 5.  Hypotension, orthostatic  6. Asbestosis  7. Abnormal MPI 9/2017 with small region of posterior partial reversibility using pharmacological stress, consistent with branch vessel distribution ischemia. Symptoms improved with addition of Toprol XL 12.5mg.     . Retired from [employer] as . Assessment/Plan:   - CHF vs. Pneumonia, ISRAEL, increased pBNP, but no peripheral edema  - Afib  - Increased troponin (peaked), no chest pain, no acute ST changes  - Anemia receiving tx  - Elderly     Poor candidate for invasive rx. Would start with medical Rx. Off BiPAP    Echo w/ EF 55-60%, mild AoS, mild to mod TR    Some confusion    HgB improved    sCr stable     - Holding xarelto, s/p PRBC  - Holding asa due to anemia  - Holding beta blocker due to orthostatic hypotension  - Cont Midodrine 10mg TID with hold parameters  - Currently on lasix 40mg iv bid         [x]       High complexity decision making was performed in this patient at high risk for decompensation with multiple organ involvement. Subjective:     Ramon Smith denies chest pain, dyspnea. Discussed with RN events overnight.      Review of Systems:    Symptom Y/N Comments  Symptom Y/N Comments   Fever/Chills N   Chest Pain N    Poor Appetite N   Edema N    Cough N   Abdominal Pain N    Sputum N   Joint Pain N    SOB/OSEGUERA N Pruritis/Rash N    Nausea/vomit N   Tolerating PT/OT Y    Diarrhea N   Tolerating Diet Y    Constipation N   Other       Could NOT obtain due to:      Objective:      Physical Exam:    Last 24hrs VS reviewed since prior progress note. Most recent are:    Visit Vitals  BP 93/81 (BP 1 Location: Left upper arm, BP Patient Position: At rest)   Pulse 96   Temp 98.1 °F (36.7 °C)   Resp 18   Ht 6' 1\" (1.854 m)   Wt 66.5 kg (146 lb 11.2 oz)   SpO2 94%   BMI 19.35 kg/m²       Intake/Output Summary (Last 24 hours) at 3/23/2021 1533  Last data filed at 3/23/2021 1400  Gross per 24 hour   Intake    Output 3600 ml   Net -3600 ml        General Appearance: Well developed, well nourished, alert & oriented x 1,    no acute distress. Ears/Nose/Mouth/Throat: Hearing grossly normal.  Neck: Supple. Chest: Lungs clear to auscultation bilaterally. Cardiovascular: Regular rate and rhythm, S1S2 normal, no murmur. Abdomen: Soft, non-tender, bowel sounds are active. Extremities: No edema bilaterally. Skin: Warm and dry. []         Post-cath site without hematoma, bruit, tenderness, or thrill. Distal pulses intact.     PMH/SH reviewed - no change compared to H&P    Data Review    Telemetry: paced     EKG:   [x]  No new EKG for review    Lab Data Personally Reviewed:    Recent Labs     03/23/21  0612 03/22/21  0307   WBC 7.6 7.9   HGB 8.8* 8.3*   HCT 27.7* 26.5*   * 174     Recent Labs     03/21/21  0743   INR 1.5*   PTP 15.4*   APTT 28.8      Recent Labs     03/23/21  0612 03/22/21 2000 03/22/21  0307 03/21/21  0743     --  140 142   K 3.2* 2.7* 2.4* 3.1*     --  102 108   CO2 31  --  30 26   BUN 51*  --  53* 45*   CREA 1.93*  --  2.01* 1.87*   *  --  98 99   CA 8.3*  --  8.2* 8.1*   MG  --   --   --  2.6*     Recent Labs     03/21/21  1935 03/21/21  1207 03/21/21  0743   TROIQ 0.14* 0.24* 0.29*     Lab Results   Component Value Date/Time    Cholesterol, total 196 12/28/2012 04:00 AM    HDL Cholesterol 60 12/28/2012 04:00 AM    LDL, calculated 123 (H) 12/28/2012 04:00 AM    Triglyceride 65 12/28/2012 04:00 AM    CHOL/HDL Ratio 3.3 12/28/2012 04:00 AM       Recent Labs     03/21/21  0743   *   TP 7.6   ALB 2.8*   GLOB 4.8*     No results for input(s): PH, PCO2, PO2 in the last 72 hours.     Medications Personally Reviewed:    Current Facility-Administered Medications   Medication Dose Route Frequency    potassium chloride SR (KLOR-CON 10) tablet 40 mEq  40 mEq Oral DAILY    iron sucrose (VENOFER) injection 100 mg  100 mg IntraVENous DAILY    [START ON 3/24/2021] ferrous sulfate tablet 325 mg  1 Tab Oral DAILY WITH BREAKFAST    pantoprazole (PROTONIX) tablet 40 mg  40 mg Oral ACB&D    midodrine (PROAMATINE) tablet 10 mg  10 mg Oral TID PRN    acetaminophen (TYLENOL) tablet 650 mg  650 mg Oral Q6H PRN    cefTRIAXone (ROCEPHIN) 1 g in 0.9% sodium chloride (MBP/ADV) 50 mL MBP  1 g IntraVENous Q24H    sodium chloride (NS) flush 5-40 mL  5-40 mL IntraVENous Q8H    sodium chloride (NS) flush 5-40 mL  5-40 mL IntraVENous PRN    acetaminophen (TYLENOL) tablet 650 mg  650 mg Oral Q6H PRN    Or    acetaminophen (TYLENOL) suppository 650 mg  650 mg Rectal Q6H PRN    polyethylene glycol (MIRALAX) packet 17 g  17 g Oral DAILY PRN    promethazine (PHENERGAN) tablet 12.5 mg  12.5 mg Oral Q6H PRN    Or    ondansetron (ZOFRAN) injection 4 mg  4 mg IntraVENous Q6H PRN    azithromycin (ZITHROMAX) 500 mg in 0.9% sodium chloride 250 mL (VIAL-MATE)  500 mg IntraVENous Q24H    furosemide (LASIX) injection 40 mg  40 mg IntraVENous BID         Donya Decree III, DO

## 2021-03-23 NOTE — PROGRESS NOTES
1900 Received report from [facility]. Assumed care of patient.

## 2021-03-23 NOTE — PROGRESS NOTES
End of Shift Note    Bedside shift change report given to Gray Doll (oncoming nurse) by Alisa Watson (offgoing nurse). Report included the following information SBAR    Shift worked:  [phone]     Shift summary and any significant changes:    Pt sitter was relieved this morning when Pt's wife came. Carissa is currently in the room. Pt received PO potassium and IV iron today. Pt's O2 has been weaned down to 3L   Pt was very orthostatic today when working with OT. Aguiar was removed at 1400 today. He has void, but only a little. Concerns for physician to address:  Pt was orthostatic when OT got him in the chair. Zone phone for oncoming shift:   [phone]       Activity:  Activity Level: Bed Rest  Number times ambulated in hallways past shift: 0  Number of times OOB to chair past shift: 1    Cardiac:   Cardiac Monitoring: Yes      Cardiac Rhythm: Atrial fibrillation    Access:   Current line(s): PIV     Genitourinary:   Urinary status: incontinent and external catheter    Respiratory:   O2 Device: Nasal cannula  Chronic home O2 use?: NO  Incentive spirometer at bedside: NO     GI:     Current diet:  DIET CARDIAC Mechanical Soft  DIET ONE TIME MESSAGE  Passing flatus: YES  Tolerating current diet: YES       Pain Management:   Patient states pain is manageable on current regimen: N/A    Skin:  Edgar Score: 16  Interventions: float heels, increase time out of bed, PT/OT consult and internal/external urinary devices    Patient Safety:  Fall Score:  Total Score: 5  Interventions: bed/chair alarm, assistive device (walker, cane, etc), gripper socks, pt to call before getting OOB and tele sitter   High Fall Risk: Yes    Length of Stay:  Expected LOS: 3d 14h  Actual LOS: 2      Alisa Watson

## 2021-03-23 NOTE — PROGRESS NOTES
GI PROGRESS NOTE  Gwen Eckert NP  [phone] NP in-hospital cell phone M-F until 4:30  After 5pm or on weekends, please call  for physician on call    Marcela Kanner :1931 HZS:[phone]   ATTG: Dr Candie Keller  PCP: Ayala Kendall MD  Date/Time:  3/23/2021 12:08 PM     Primary GI: Dr. Angele Aschoff - Dr Tierra Sandoval covering    Reason for following: anemia    Assessment:   Chronic Iron Deficiency Anemia - On Xarelto for Afib  Acute respiratory failure with hypoxia 2/2 pulmonary infiltrates  - FOBT + but reports 2 recent nose bleeds prior to admission  - No visible melena, hematochezia, or changes in stool color  - Chronically dark stools but on oral iron  - Baseline Hgb ~ 7 (2016) and today is 8.8 up from 8.3 yesterday, but spouse reports pt had Hgb of 11.1 in 2020.   - No abdominal pain, nausea, or vomiting - pt eating full tray during out visit  - EGD : 2017 : -Normal esophagus; biospied to exclude esophagitis -Normal stomach; biopsied to exclude gastritis -Normal duodenum    - CLN : 2017 : -Normal terminal ileum -Sigmoid diverticulosis -Single diminutive, benign-appearing 2mm sessile polyp in sigmoid colon at 45cm; removed with cold snare  -Single diminutive, benign-appearing 2mm sessile polyp in rectum at 20cm; removed with cold snare    Acute Renal Failure  Acute CHF  Hx SSS - pacemaker     Plan:   · No plan for endoscopic procedures at that time without overt bleeding and high risk for procedures also with hypoxia  · Continue BID PPI  · Serial H/H - transfuse PRN  · Hold xarelto for 1 week if possible, then okay to resume per GI if remains stable  · Rest per primary team    Nothing further to add at this time without active bleeding and stable hgb. Will sign off. Plan discussed with Dr Tierra Sandoval. Subjective:   Discussed with RN events overnight. No abdominal pain. No nausea. No complaints.      Complaint Y/N Description   Abdominal Pain n    Hematemesis n    Hematochezia n    Melena n    Constipation n    Diarrhea n    Dyspepsia n    Dysphagia n    Jaundiced n    Nausea/vomiting n      Review of Systems:  Symptom Y/N Comments  Symptom Y/N Comments   Fever/Chills n   Chest Pain     Cough n   Headaches     Sputum n   Joint Pain     SOB/OSEGUERA n   Pruritis/Rash     Tolerating Diet y   Other       Could NOT obtain due to:      Objective:   VITALS:   Last 24hrs VS reviewed since prior progress note. Most recent are:  Visit Vitals  /89   Pulse 70   Temp 97.9 °F (36.6 °C)   Resp 18   Ht 6' 1\" (1.854 m)   Wt 66.5 kg (146 lb 11.2 oz)   SpO2 96%   BMI 19.35 kg/m²       Intake/Output Summary (Last 24 hours) at 3/23/2021 1400  Last data filed at 3/23/2021 0700  Gross per 24 hour   Intake    Output 3250 ml   Net -3250 ml     PHYSICAL EXAM:  General: WD, WN. Alert, cooperative, no acute distress    HEENT: NC, Atraumatic. Anicteric sclerae. Lungs:  CTA Bilaterally. No Wheezing/Rhonchi/Rales. Heart:  Regular  rhythm,  No murmur (-), No Rubs, No Gallops  Abdomen: Soft, Non distended, Non tender.  +Bowel sounds, no HSM  Extremities: Trace BLE edema. Neurologic:  Alert and oriented X self and place. No acute neurological distress   Psych:   Good insight. Not anxious nor agitated. Lab and Radiology Data Reviewed: (see below)    Medications Reviewed: (see below)  PMH/SH reviewed - no change compared to H&P  ________________________________________________________________________  Total time spent with patient: 20 minutes ________________________________________________________________________  Care Plan discussed with:  Patient y   Family  y - spouse   RN y              Consultant: Yaa Done, NP     Procedures: see electronic medical records for all procedures/Xrays and details which were not copied into this note but were reviewed prior to creation of Plan.       LABS:  Recent Labs     03/23/21  0612 03/22/21  0307   WBC 7.6 7.9   HGB 8.8* 8.3*   HCT 27.7* 26.5*   * 174     Recent Labs     03/23/21  0612 03/22/21 2000 03/22/21  0307 03/21/21  0743     --  140 142   K 3.2* 2.7* 2.4* 3.1*     --  102 108   CO2 31  --  30 26   BUN 51*  --  53* 45*   CREA 1.93*  --  2.01* 1.87*   *  --  98 99   CA 8.3*  --  8.2* 8.1*   MG  --   --   --  2.6*     Recent Labs     03/21/21  0743   *   TP 7.6   ALB 2.8*   GLOB 4.8*     Recent Labs     03/21/21  0743   INR 1.5*   PTP 15.4*   APTT 28.8      Recent Labs     03/21/21  0921   TIBC 211*   PSAT 10*   FERR 95      No results found for: FOL, RBCF  No results for input(s): PH, PCO2, PO2 in the last 72 hours. No results for input(s): CPK, CKMB in the last 72 hours.     No lab exists for component: TROPONINI  No results found for: COLOR, APPRN, SPGRU, REFSG, SAL, PROTU, GLUCU, KETU, BILU, UROU, SANA, LEUKU, GLUKE, EPSU, BACTU, WBCU, RBCU, CASTS, UCRY    MEDICATIONS:  Current Facility-Administered Medications   Medication Dose Route Frequency    potassium chloride SR (KLOR-CON 10) tablet 40 mEq  40 mEq Oral DAILY    iron sucrose (VENOFER) injection 100 mg  100 mg IntraVENous DAILY    [START ON 3/24/2021] ferrous sulfate tablet 325 mg  1 Tab Oral DAILY WITH BREAKFAST    pantoprazole (PROTONIX) tablet 40 mg  40 mg Oral ACB&D    midodrine (PROAMATINE) tablet 10 mg  10 mg Oral TID PRN    acetaminophen (TYLENOL) tablet 650 mg  650 mg Oral Q6H PRN    cefTRIAXone (ROCEPHIN) 1 g in 0.9% sodium chloride (MBP/ADV) 50 mL MBP  1 g IntraVENous Q24H    sodium chloride (NS) flush 5-40 mL  5-40 mL IntraVENous Q8H    sodium chloride (NS) flush 5-40 mL  5-40 mL IntraVENous PRN    acetaminophen (TYLENOL) tablet 650 mg  650 mg Oral Q6H PRN    Or    acetaminophen (TYLENOL) suppository 650 mg  650 mg Rectal Q6H PRN    polyethylene glycol (MIRALAX) packet 17 g  17 g Oral DAILY PRN    promethazine (PHENERGAN) tablet 12.5 mg  12.5 mg Oral Q6H PRN    Or    ondansetron (ZOFRAN) injection 4 mg  4 mg IntraVENous Q6H PRN    azithromycin (ZITHROMAX) 500 mg in 0.9% sodium chloride 250 mL (VIAL-MATE)  500 mg IntraVENous Q24H    furosemide (LASIX) injection 40 mg  40 mg IntraVENous BID

## 2021-03-23 NOTE — PROGRESS NOTES
Received message from patient's nurse Denver Bruch stating :    Pt's potassium level is 2.7. (was 2.4 earlier today). Discussion / orders:    Ordered potassium chloride 10 mEq IV x5         Please note that this note was dictated using Dragon computer voice recognition software. Quite often unanticipated grammatical, syntax, homophones, and other interpretive errors are inadvertently transcribed by the computer software. Please disregard these errors. Please excuse any errors that have escaped final proofreading.

## 2021-03-23 NOTE — PROGRESS NOTES
Hospitalist Progress Note    NAME: Shlomo Conroy   :  1931   MRN:  [id number]       Assessment / Plan:  Acute hypoxic respiratory failure POA  Suspect acute systolic CHF/ PNA  Possible pneumonia  -Not on home O2  -Continue supplemental O2 to maintain sats > 92%  -Rapid Covid negative  -Flu negative and respiratory panel negative for tested viruses  -Continue diuresis with IV Lasix  -Strict I's/O, daily weights  -Keep Aguiar to monitor I's/O's for now  -proBNP 30 K on admission  -Troponin bump in setting of heart failure and ISRAEL  -Appreciate cardiology consult  -Continue antibiotic coverage with azithromycin and ceftriaxone  -EKG NSR ST/T wave abnormality possible inferior ischemia  -Poor candidate for invasive procedures  -Medical management for now as per cardiology  -Lower suspicion of bacterial pneumonia    TTE:  Normal systolic function, EF 34-42%    CXR:  Bilateral nonspecific pneumonia is new and superimposed on chronic calcified  pleural disease and interstitial lung disease. Recommend followup PA and lateral chest views in 8-10 weeks to ensure  Resolution.     3/23:  Continue diuresis  Wean O2 as tolerated - currently on 5L NC - not on home O2    Orthostatic hypotension  -Continue midodrine with holding parameters    Hypokalemia  -Replace, repeat a.m. labs    History of A. fib, on Xarelto  History of SSS, has a pacemaker  -Continue holding Xarelto   -Continue metoprolol     Acute on chronic anemia  -Has been on iron infusion therapy previously  -Hold Xarelto  -Monitor CBC and transfuse as needed for Hb less than 7  -Continue IV PPI  -Appreciate GI consult  -Given patient frailty and tenuous respiratory status, thought procedures risk likely outweighs benefit and no overt signs of GI bleed currently    3/23:  Give IV Venofer  Monitor Hgb     Elevated creatinine, 1.87  Unsure of what the baseline  Repeat BMP tomorrow, avoid nephrotoxic medication    Memory loss  Probable underlying dementia  -Patient agitated at points, given Zyprexa  -Close monitoring, fall precautions  -May need sitter     History of HLD  History of TIA       Code Status: DNR  Surrogate Decision Maker: Wife     DVT Prophylaxis: SCD  GI Prophylaxis: not indicated     Baseline: Ambulatory     Subjective:     Chief Complaint / Reason for Physician Visit  Patient cannot provide much history. Discussed care with patient's wife and daughter who are in the room during rounds. Respiratory status somewhat improved. Patient is on BiPAP and now on 5 LPM NC O2    Discussed with RN events overnight. Review of Systems:  Symptom Y/N Comments  Symptom Y/N Comments   Fever/Chills    Chest Pain     Poor Appetite    Edema     Cough    Abdominal Pain     Sputum    Joint Pain     SOB/OSEGUERA    Pruritis/Rash     Nausea/vomit    Tolerating PT/OT     Diarrhea    Tolerating Diet     Constipation    Other       Could NOT obtain due to:  AMS     Objective:     VITALS:   Last 24hrs VS reviewed since prior progress note. Most recent are:  Patient Vitals for the past 24 hrs:   Temp Pulse Resp BP SpO2   03/23/21 1059 97.9 °F (36.6 °C) 70 18 102/89 96 %   03/23/21 0751 98.2 °F (36.8 °C) 66 18 118/74 97 %   03/23/21 0324 97.7 °F (36.5 °C) 74 20 135/63 98 %   03/22/21 2246 98.2 °F (36.8 °C) 94 20 126/81 95 %   03/22/21 1936 97.4 °F (36.3 °C) 96 18 (!) 116/92 97 %   03/22/21 1531 97.8 °F (36.6 °C) 82 29 126/66 96 %       Intake/Output Summary (Last 24 hours) at 3/23/2021 1354  Last data filed at 3/23/2021 0700  Gross per 24 hour   Intake    Output 3250 ml   Net -3250 ml        I had a face to face encounter and independently examined this patient on 3/23/2021, as outlined below:  PHYSICAL EXAM:  General: Elderly  adult male, no acute distress   EENT:  EOMI. Anicteric sclerae. MMM  Resp:  Diminished air entry bilateral lung hsieh, bibasilar Rales. No accessory muscle use  CV:  Regular  rhythm,  No edema  GI:  Soft, Non distended, Non tender.   +Bowel sounds  Neurologic:  Limited exam, moves all extremities, follows some commands, oriented to person and family room  Psych: Moderately agitated mood, appropriate affect  Skin:  No rashes. No jaundice    Reviewed most current lab test results and cultures  YES  Reviewed most current radiology test results   YES  Review and summation of old records today    NO  Reviewed patient's current orders and MAR    YES  PMH/SH reviewed - no change compared to H&P  ________________________________________________________________________  Care Plan discussed with:    Comments   Patient x    Family  x    RN x    Care Manager x    Consultant                       x Multidiciplinary team rounds were held today with , nursing, pharmacist and clinical coordinator. Patient's plan of care was discussed; medications were reviewed and discharge planning was addressed. ________________________________________________________________________  Total NON critical care TIME: 35   Minutes    Total CRITICAL CARE TIME Spent:   Minutes non procedure based      Comments   >50% of visit spent in counseling and coordination of care x    ________________________________________________________________________  Millicent Albright MD     Procedures: see electronic medical records for all procedures/Xrays and details which were not copied into this note but were reviewed prior to creation of Plan. LABS:  I reviewed today's most current labs and imaging studies.   Pertinent labs include:  Recent Labs     03/23/21  0612 03/22/21  0307 03/21/21  0743   WBC 7.6 7.9 8.6   HGB 8.8* 8.3* 7.1*   HCT 27.7* 26.5* 23.1*   * 174 194     Recent Labs     03/23/21  0612 03/22/21 2000 03/22/21 0307 03/21/21  0743     --  140 142   K 3.2* 2.7* 2.4* 3.1*     --  102 108   CO2 31  --  30 26   *  --  98 99   BUN 51*  --  53* 45*   CREA 1.93*  --  2.01* 1.87*   CA 8.3*  --  8.2* 8.1*   MG  --   --   --  2.6*   ALB  --   --   -- 2.8*   TBILI  --   --   --  0.7   ALT  --   --   --  19   INR  --   --   --  1.5*       Signed: Rajan Kirby MD

## 2021-03-23 NOTE — PROGRESS NOTES
0700  Received report from NICO Dang. Assumed care of patient. Paged physician in reference to family request to speak with them. Patient family is requesting patient have sedatives to calm him down when he gets agitated. Paged physician. Family came out of room stating patient is pulling at everything, took his oxygen off and yanked his aponte. Paged physician again for medications for patient's agitation. Patient family came out stating patient coughed up large amount of blood. Patient coughed up minimal dark blood mixed with mucous. Paged physician. 1900  End of Shift Note    Bedside shift change report given to Nickie Leong RN (oncoming nurse) by Solitario Palm RN (offgoing nurse). Report included the following information SBAR, Kardex, MAR and Cardiac Rhythm AFIB/paced    Shift worked:  7a-7p     Shift summary and any significant changes:     pt gets agitated, attempted to get sedative per family request     Concerns for physician to address:  sedative medications     Zone phone for oncoming shift:   na       Activity:  Activity Level: Bed Rest  Number times ambulated in hallways past shift: 0  Number of times OOB to chair past shift: 0    Cardiac:   Cardiac Monitoring: Yes      Cardiac Rhythm: Atrial fibrillation    Access:   Current line(s): PIV     Genitourinary:   Urinary status: aponte    Respiratory:   O2 Device: Nasal cannula  Chronic home O2 use?: NO  Incentive spirometer at bedside: NO     GI:     Current diet:  DIET CARDIAC Mechanical Soft  DIET ONE TIME MESSAGE  Passing flatus: YES  Tolerating current diet: YES       Pain Management:   Patient states pain is manageable on current regimen: YES    Skin:  Edgar Score: 16  Interventions: float heels, increase time out of bed, PT/OT consult and internal/external urinary devices    Patient Safety:  Fall Score:  Total Score: 5  Interventions: bed/chair alarm, assistive device (walker, cane, etc), gripper socks and pt to call before getting OOB  High Fall Risk: Yes    Length of Stay:  Expected LOS: - - -  Actual LOS: [address] [facility] [address], RN

## 2021-03-23 NOTE — PROGRESS NOTES
Problem: Falls - Risk of  Goal: *Absence of Falls  Description: Document Manuel Kallie Fall Risk and appropriate interventions in the flowsheet.   Outcome: Progressing Towards Goal  Note: Fall Risk Interventions:  Mobility Interventions: Bed/chair exit alarm, Patient to call before getting OOB    Mentation Interventions: Bed/chair exit alarm, Adequate sleep, hydration, pain control    Medication Interventions: Bed/chair exit alarm    Elimination Interventions: Call light in reach, Bed/chair exit alarm    History of Falls Interventions: Bed/chair exit alarm

## 2021-03-23 NOTE — PROGRESS NOTES
End of Shift Note    Bedside shift change report given to Suly Rome RN (oncoming nurse) by Beni Dias RN (offgoing nurse). Report included the following information SBAR. Shift worked:  7pm-7am     Shift summary and any significant changes:     Pt's potassium level at 2000 was 2.7. 5 runs of KCL IV given. Atarax given for severe agitation. Pt now has sitter at bedside due to confusion, pulling at aponte and iv.      Concerns for physician to address:       Zone phone for oncoming shift:

## 2021-03-24 LAB
ANION GAP SERPL CALC-SCNC: 7 MMOL/L (ref 5–15)
BUN SERPL-MCNC: 57 MG/DL (ref 6–20)
BUN/CREAT SERPL: 30 (ref 12–20)
CALCIUM SERPL-MCNC: 8.1 MG/DL (ref 8.5–10.1)
CHLORIDE SERPL-SCNC: 101 MMOL/L (ref 97–108)
CO2 SERPL-SCNC: 31 MMOL/L (ref 21–32)
CREAT SERPL-MCNC: 1.92 MG/DL (ref 0.7–1.3)
ERYTHROCYTE [DISTWIDTH] IN BLOOD BY AUTOMATED COUNT: 15.1 % (ref 11.5–14.5)
GLUCOSE SERPL-MCNC: 104 MG/DL (ref 65–100)
HCT VFR BLD AUTO: 27.1 % (ref 36.6–50.3)
HGB BLD-MCNC: 8.5 G/DL (ref 12.1–17)
MCH RBC QN AUTO: 30 PG (ref 26–34)
MCHC RBC AUTO-ENTMCNC: 31.4 G/DL (ref 30–36.5)
MCV RBC AUTO: 95.8 FL (ref 80–99)
NRBC # BLD: 0.05 K/UL (ref 0–0.01)
NRBC BLD-RTO: 0.6 PER 100 WBC
PLATELET # BLD AUTO: 146 K/UL (ref 150–400)
PMV BLD AUTO: 9.9 FL (ref 8.9–12.9)
POTASSIUM SERPL-SCNC: 3.1 MMOL/L (ref 3.5–5.1)
RBC # BLD AUTO: 2.83 M/UL (ref 4.1–5.7)
SODIUM SERPL-SCNC: 139 MMOL/L (ref 136–145)
WBC # BLD AUTO: 8.5 K/UL (ref 4.1–11.1)

## 2021-03-24 PROCEDURE — 65660000001 HC RM ICU INTERMED STEPDOWN

## 2021-03-24 PROCEDURE — 74011250636 HC RX REV CODE- 250/636: Performed by: GENERAL ACUTE CARE HOSPITAL

## 2021-03-24 PROCEDURE — 36415 COLL VENOUS BLD VENIPUNCTURE: CPT

## 2021-03-24 PROCEDURE — 80048 BASIC METABOLIC PNL TOTAL CA: CPT

## 2021-03-24 PROCEDURE — 74011250636 HC RX REV CODE- 250/636: Performed by: INTERNAL MEDICINE

## 2021-03-24 PROCEDURE — 77010033678 HC OXYGEN DAILY

## 2021-03-24 PROCEDURE — 97110 THERAPEUTIC EXERCISES: CPT

## 2021-03-24 PROCEDURE — 74011250637 HC RX REV CODE- 250/637: Performed by: GENERAL ACUTE CARE HOSPITAL

## 2021-03-24 PROCEDURE — 74011000258 HC RX REV CODE- 258: Performed by: INTERNAL MEDICINE

## 2021-03-24 PROCEDURE — 85027 COMPLETE CBC AUTOMATED: CPT

## 2021-03-24 PROCEDURE — 97530 THERAPEUTIC ACTIVITIES: CPT

## 2021-03-24 RX ORDER — POTASSIUM CHLORIDE 20 MEQ/1
40 TABLET, EXTENDED RELEASE ORAL
Status: COMPLETED | OUTPATIENT
Start: 2021-03-24 | End: 2021-03-24

## 2021-03-24 RX ADMIN — POTASSIUM CHLORIDE 40 MEQ: 1500 TABLET, EXTENDED RELEASE ORAL at 17:48

## 2021-03-24 RX ADMIN — POTASSIUM CHLORIDE 40 MEQ: 750 TABLET, FILM COATED, EXTENDED RELEASE ORAL at 10:32

## 2021-03-24 RX ADMIN — Medication 10 ML: at 02:24

## 2021-03-24 RX ADMIN — IRON SUCROSE 100 MG: 20 INJECTION, SOLUTION INTRAVENOUS at 10:31

## 2021-03-24 RX ADMIN — CEFTRIAXONE 1 G: 1 INJECTION, POWDER, FOR SOLUTION INTRAMUSCULAR; INTRAVENOUS at 10:33

## 2021-03-24 RX ADMIN — FERROUS SULFATE TAB 325 MG (65 MG ELEMENTAL FE) 325 MG: 325 (65 FE) TAB at 10:38

## 2021-03-24 RX ADMIN — PANTOPRAZOLE SODIUM 40 MG: 40 TABLET, DELAYED RELEASE ORAL at 10:31

## 2021-03-24 RX ADMIN — AZITHROMYCIN MONOHYDRATE 500 MG: 500 INJECTION, POWDER, LYOPHILIZED, FOR SOLUTION INTRAVENOUS at 10:33

## 2021-03-24 RX ADMIN — PANTOPRAZOLE SODIUM 40 MG: 40 TABLET, DELAYED RELEASE ORAL at 17:52

## 2021-03-24 RX ADMIN — FUROSEMIDE 40 MG: 10 INJECTION, SOLUTION INTRAMUSCULAR; INTRAVENOUS at 10:33

## 2021-03-24 RX ADMIN — Medication 10 ML: at 17:49

## 2021-03-24 RX ADMIN — FUROSEMIDE 40 MG: 10 INJECTION, SOLUTION INTRAMUSCULAR; INTRAVENOUS at 17:48

## 2021-03-24 NOTE — PROGRESS NOTES
Hospitalist Progress Note    NAME: Ramon Smith   :  1931   MRN:  [id number]       Assessment / Plan:  Acute hypoxic respiratory failure POA  Suspect acute systolic CHF/ PNA  Possible pneumonia  -Not on home O2  -Continue supplemental O2 to maintain sats > 92%  -Rapid Covid negative  -Flu negative and respiratory panel negative for tested viruses  -Continue diuresis with IV Lasix  -Strict I's/O, daily weights  -Keep Aguiar to monitor I's/O's for now  -proBNP 30 K on admission  -Troponin bump in setting of heart failure and ISRAEL  -Appreciate cardiology consult  -Continue antibiotic coverage with azithromycin and ceftriaxone  -EKG NSR ST/T wave abnormality possible inferior ischemia  -Poor candidate for invasive procedures  -Medical management for now as per cardiology  -Lower suspicion of bacterial pneumonia    TTE:  Normal systolic function, EF 40-96%    CXR:  Bilateral nonspecific pneumonia is new and superimposed on chronic calcified  pleural disease and interstitial lung disease. Recommend followup PA and lateral chest views in 8-10 weeks to ensure  Resolution.     3/24:  Continue diuresis  Wean O2 as tolerated - currently on 4L NC - not on home O2  Replete K  Repeat CXR  tmr AM    Orthostatic hypotension  -Continue midodrine with holding parameters    Hypokalemia  -Replace, repeat a.m. labs    History of A. fib, on Xarelto  History of SSS, has a pacemaker  -Continue holding Xarelto   -Continue metoprolol     Acute on chronic anemia  -Has been on iron infusion therapy previously  -Hold Xarelto  -Monitor CBC and transfuse as needed for Hb less than 7  -Continue IV PPI  -Appreciate GI consult  -Given patient frailty and tenuous respiratory status, thought procedures risk likely outweighs benefit and no overt signs of GI bleed currently    3/24:  Give IV Venofer  Monitor Hgb     Elevated creatinine, 1.87  Unsure of what the baseline  Repeat BMP tomorrow, avoid nephrotoxic medication    Memory loss  Probable underlying dementia  -Patient agitated at points, given Zyprexa  -Close monitoring, fall precautions  -May need sitter     History of HLD  History of TIA       Code Status: DNR  Surrogate Decision Maker: Wife     DVT Prophylaxis: SCD  GI Prophylaxis: not indicated     Baseline: Ambulatory     Subjective:     Chief Complaint / Reason for Physician Visit  Pleasant. Updated daughter at bedside    Discussed with RN events overnight. Review of Systems:  Symptom Y/N Comments  Symptom Y/N Comments   Fever/Chills    Chest Pain     Poor Appetite    Edema     Cough    Abdominal Pain     Sputum    Joint Pain     SOB/OSEGUERA    Pruritis/Rash     Nausea/vomit    Tolerating PT/OT     Diarrhea    Tolerating Diet     Constipation    Other       Could NOT obtain due to:  AMS     Objective:     VITALS:   Last 24hrs VS reviewed since prior progress note. Most recent are:  Patient Vitals for the past 24 hrs:   Temp Pulse Resp BP SpO2   03/24/21 1200     (!) 1 %   03/24/21 1101 97.8 °F (36.6 °C) 72 18 110/74 94 %   03/24/21 1031  75  104/70    03/24/21 0904 97.5 °F (36.4 °C) 76 18 108/68 96 %   03/24/21 0219 98.6 °F (37 °C) 70 16 102/72 97 %   03/23/21 2255 98.5 °F (36.9 °C) 79 19 (!) 152/75 92 %   03/23/21 1919 98.2 °F (36.8 °C) 76 20 120/63 93 %   03/23/21 1817  78 17 98/63    03/23/21 1816  73 16 (!) 107/55        Intake/Output Summary (Last 24 hours) at 3/24/2021 1513  Last data filed at 3/24/2021 1348  Gross per 24 hour   Intake    Output 1075 ml   Net -1075 ml        I had a face to face encounter and independently examined this patient on 3/24/2021, as outlined below:  PHYSICAL EXAM:  General: Elderly  adult male, no acute distress   EENT:  EOMI. Anicteric sclerae. MMM  Resp:  Diminished air entry bilateral lung hsieh, bibasilar Rales. No accessory muscle use  CV:  Regular  rhythm,  No edema  GI:  Soft, Non distended, Non tender.   +Bowel sounds  Neurologic:  Limited exam, moves all extremities, follows some commands, oriented to person and family room  Psych: Moderately agitated mood, appropriate affect  Skin:  No rashes. No jaundice    Reviewed most current lab test results and cultures  YES  Reviewed most current radiology test results   YES  Review and summation of old records today    NO  Reviewed patient's current orders and MAR    YES  PMH/SH reviewed - no change compared to H&P  ________________________________________________________________________  Care Plan discussed with:    Comments   Patient x    Family  x    RN x    Care Manager x    Consultant                       x Multidiciplinary team rounds were held today with , nursing, pharmacist and clinical coordinator. Patient's plan of care was discussed; medications were reviewed and discharge planning was addressed. ________________________________________________________________________  Total NON critical care TIME: 35   Minutes    Total CRITICAL CARE TIME Spent:   Minutes non procedure based      Comments   >50% of visit spent in counseling and coordination of care x    ________________________________________________________________________  Srini Omalley MD     Procedures: see electronic medical records for all procedures/Xrays and details which were not copied into this note but were reviewed prior to creation of Plan. LABS:  I reviewed today's most current labs and imaging studies.   Pertinent labs include:  Recent Labs     03/24/21  0212 03/23/21  0612 03/22/21  0307   WBC 8.5 7.6 7.9   HGB 8.5* 8.8* 8.3*   HCT 27.1* 27.7* 26.5*   * 145* 174     Recent Labs     03/24/21  1220 03/23/21  0612 03/22/21 2000 03/22/21  0307    142  --  140   K 3.1* 3.2* 2.7* 2.4*    105  --  102   CO2 31 31  --  30   * 105*  --  98   BUN 57* 51*  --  53*   CREA 1.92* 1.93*  --  2.01*   CA 8.1* 8.3*  --  8.2*       Signed: Srini Omalley MD

## 2021-03-24 NOTE — PROGRESS NOTES
[employer] Telesitter Monitor initiated on 3/23/2021 at 1900 for the following reason(s): Confusion, pulling at lines, and monitoring . Patient educated on use of camera and is in agreement. If patient unable to verbalize understanding of camera necessity, the responsible party notified and educated: The patient and the patient's wife Duncan Sadler were educated on [employer].

## 2021-03-24 NOTE — PROGRESS NOTES
End of Shift Note    Bedside shift change report given to James Leon (oncoming nurse) by Nell Glasgow RN (offgoing nurse). Report included the following information SBAR and Kardex    Shift worked:  Day     Shift summary and any significant changes: Tolerated treatment well. On 1L NC. No significant events during shift. Concerns for physician to address:  NA     Zone phone for oncoming shift:          Activity:  Activity Level: Bed Rest  Number times ambulated in hallways past shift: 0  Number of times OOB to chair past shift: 0    Cardiac:   Cardiac Monitoring: Yes      Cardiac Rhythm: Paced    Access:   Current line(s): PIV     Genitourinary:   Urinary status: voiding    Respiratory:   O2 Device: Nasal cannula  Chronic home O2 use?: NO  Incentive spirometer at bedside: NO     GI:  Last Bowel Movement Date: 03/20/21  Current diet:  DIET CARDIAC Mechanical Soft  DIET ONE TIME MESSAGE  Passing flatus: YES  Tolerating current diet: YES       Pain Management:   Patient states pain is manageable on current regimen: YES    Skin:  Edgar Score: 15  Interventions: float heels, increase time out of bed and PT/OT consult    Patient Safety:  Fall Score:  Total Score: 5  Interventions: bed/chair alarm, assistive device (walker, cane, etc) and pt to call before getting OOB  High Fall Risk: Yes    Length of Stay:  Expected LOS: 3d 14h  Actual LOS: Alpa Contreras RN

## 2021-03-24 NOTE — PROGRESS NOTES
Problem: Falls - Risk of  Goal: *Absence of Falls  Description: Document Clydene Bone Fall Risk and appropriate interventions in the flowsheet.   Outcome: Progressing Towards Goal  Note: Fall Risk Interventions:  Mobility Interventions: Bed/chair exit alarm, OT consult for ADLs, Patient to call before getting OOB, PT Consult for mobility concerns, PT Consult for assist device competence    Mentation Interventions: Adequate sleep, hydration, pain control, Bed/chair exit alarm, Door open when patient unattended    Medication Interventions: Bed/chair exit alarm    Elimination Interventions: Bed/chair exit alarm, Call light in reach    History of Falls Interventions: Bed/chair exit alarm, Investigate reason for fall

## 2021-03-24 NOTE — PROGRESS NOTES
Transition of Care Plan:     RUR: 18%  Disposition: [place] [facility] SNF on Friday if stable  SNF-will need rapid covid within 72 hours of d/c  Sign completed DDNR prior to d/c  Follow up appointments: PCP after SNF  DME needed: none  Transportation at Discharge: Wife, dtr,  or [facility] to transportation   [address] or means to access home:   Wife has access to home     IM Medicare letter:2nd IM at d/c-done 3/24  Caregiver Contact: Wife- Giselle Paul- [phone]  Discharge Caregiver contacted prior to discharge?      done 3/24    3p  D/c plans discussed with dtr Pablo Sandoval and wife(on zoom). Wife requests SNF at d/c.  FOC, 2nd IM letter signed by Pablo Sandoval with wife's permission. Pablo Sandoval says she will transport on Friday if appropriate as wife is [facility] and wants dtr to hear d/c instructions.   Pablo Sandoval will give wife a blank copy of the yellow DDNR to sign if she is in agreement.

## 2021-03-24 NOTE — PROGRESS NOTES
Problem: Mobility Impaired (Adult and Pediatric)  Goal: *Acute Goals and Plan of Care (Insert Text)  Description: FUNCTIONAL STATUS PRIOR TO ADMISSION: Patient was modified independent using a single point cane and wife for support for functional mobility. Pt's spouse at bedside and reported pt's mobility was declining over the past 5-6 days. Pt with history of a fall in Dec. 2020. Pt with cognitive impairment at baseline    [address]: The patient lived with spouse and was requiring increased assistance for mobility ~5-6 days leading up to admission. Physical Therapy Goals  Initiated 3/22/2021  1. Patient will move from supine to sit and sit to supine  in bed with minimal assistance/contact guard assist within 7 day(s). 2.  Patient will transfer from bed to chair and chair to bed with minimal assistance/contact guard assist using the least restrictive device within 7 day(s). 3.  Patient will perform sit to stand with minimal assistance/contact guard assist within 7 day(s). 4.  Patient will ambulate with minimal assistance/contact guard assist for 25 feet with the least restrictive device within 7 day(s). Outcome: Not Progressing Towards Goal   PHYSICAL THERAPY TREATMENT  Patient: Adair Costa (61 y.o. male)  Date: 3/24/2021  Diagnosis: Acute respiratory failure with hypoxia ([facility] [J96.01] <principal problem not specified>       Precautions: Fall  Chart, physical therapy assessment, plan of care and goals were reviewed. ASSESSMENT  Patient continues with skilled PT services and is not progressing towards goals. Patient received in bed, is very drowsy but awakens to loud voice, daughter present in room and wife on facetime call. Patient required mod assist to come to sitting position, then constant support due to strong posterior lean, patient did not correct with cues.   Patient was able to perform seated LE exercise for LAQ and ankle pumps with support but then requested to lie back down. BP checked after activity and was within normal.  Patient performed bed exercises with assistance and rolled right and left to position pads. Patient left with call bell in reach and daughter still in room, limited today by fatigue, drowsiness, and poor activity tolerance  and is well below baseline. Current Level of Function Impacting Discharge (mobility/balance): mod assist for bed mobility, constant support in sitting, did not attempt standing (too drowsy)    Other factors to consider for discharge: high falls risk, below baseline, poor activity toelrance         PLAN :  Patient continues to benefit from skilled intervention to address the above impairments. Continue treatment per established plan of care. to address goals. Recommendation for discharge: (in order for the patient to meet his/her long term goals)  Therapy up to 5 days/week in SNF setting    This discharge recommendation:  Has been made in collaboration with the attending provider and/or case management    IF patient discharges home will need the following DME: to be determined (TBD)       SUBJECTIVE:   Patient stated can I lie back down.     OBJECTIVE DATA SUMMARY:   Critical Behavior:  Neurologic State: Alert, Confused  Orientation Level: Oriented to person, Disoriented to situation, Disoriented to place, Disoriented to time  Cognition: Follows commands, Impaired decision making, Decreased attention/concentration  Safety/Judgement: Decreased awareness of environment, Decreased awareness of need for safety, Decreased awareness of need for assistance, Decreased insight into deficits  Functional Mobility Training:  Bed Mobility:  Rolling: Minimum assistance  Supine to Sit: Moderate assistance  Sit to Supine:  Moderate assistance  Scooting: Minimum assistance        Transfers:                                   Balance:  Sitting: Impaired  Sitting - Static: Poor (constant support)  Sitting - Dynamic: Poor (constant support)  Ambulation/Gait Training:                                                        Stairs: Therapeutic Exercises:   Seated LAQ, ankle pumps, supine SLR, heel slides, resisted ankle pumps, hip abd add x 5 bilaterally  Pain Rating:      Activity Tolerance:   Poor    After treatment patient left in no apparent distress:   Supine in bed, Call bell within reach, Caregiver / family present, and Side rails x 3    COMMUNICATION/COLLABORATION:   The patients plan of care was discussed with: Occupational therapist and Registered nurse.      Carri Cuellar, PT   Time Calculation: 29 mins

## 2021-03-24 NOTE — PROGRESS NOTES
Problem: Falls - Risk of  Goal: *Absence of Falls  Description: Document Laurie Alba Fall Risk and appropriate interventions in the flowsheet.   Outcome: Progressing Towards Goal  Note: Fall Risk Interventions:  Mobility Interventions: Bed/chair exit alarm, Patient to call before getting OOB    Mentation Interventions: Adequate sleep, hydration, pain control, Bed/chair exit alarm, Increase mobility    Medication Interventions: Bed/chair exit alarm, Patient to call before getting OOB    Elimination Interventions: Bed/chair exit alarm, Call light in reach, Patient to call for help with toileting needs    History of Falls Interventions: Bed/chair exit alarm, Investigate reason for fall

## 2021-03-24 NOTE — PROGRESS NOTES
Progress Note      3/24/2021 2:56 PM  NAME: Keith Chen   MRN:  [id number]   Admit Diagnosis: Acute respiratory failure with hypoxia [facility]) [J96.01]      Problem List:   3/21:  Presents with hypoxemic resp failure with bilateral infiltrates, on bipap anemia with Hg 7.1, increas trop 0.25        11/20:  Doing well; intermittent use of midodrine. No CP or SOB. Wife says his Rebecca Scripture" is worse. No palps. 5/20 (VV): Doing well. BPs better since last OV and needs midodrine less often. Takes maybe three times per week. Mild intermittent OSEGUERA is unchanged. No CP, syncope, falls or edema. 10/19:  BPs have been lower. He has a compression fx now. Memory loss and confusion becoming an issue. Dermatologist said he has poor circulation in his feet. 4/19: Follow up after adding midodrine. Isn't needing the med every day, but dizziness has improved since starting it. No CP, SOB, palpitations, edema or syncope. Foot infection is improving. 3/19:  Dizzy and LH when standing up or changing positions for the past few weeks. No syncope. No CP, SOB, orthopnea, PND, JULIAN. Gets leg cramps in the AM.  Concerned about an infection on his feet. 9/18: Doing ok. Recently diagnosed with 2 compression fractures in his thoracic spine. Also continues to have trouble with memory. Chronic SOB is stable. No lightheadedness, CP, palpitations. Goes to PT twice per week, walks dog daily. Weight down 13 lbs.  3/18: Some SOB with exertion in the cold weather, but overall doing \"very well\". He can go up and down the stairs without any issues. No CP, palpitations, syncope, edema. ASA stopped January d/t gastritis and + hemeoccult. 9/17:  Doing ok; Hgb better; seeing heme next month; inc OSEGUERA but no CP. No bleeding. Seeing naturopath for reflux. 3/17:  Doing well. Had EGD/cscope with esophagitis/gastritis. Also with chest CTA indicating moderate coronary Ca++. Remains without exertional symptoms. Active. No complaints.   GI wants to start PPI.  12/16:  feels SBO and dizzy, but not as bad as he has in the past.  Hgb recently 7.4 and 8.3 (on xarelto). Says he recently had a CXR with flui on his lungs. No orthopnea or JULIAN. No PND. Does have a NP cough. No syncope. No CP. Getting O2 at home from his PCP        Problems:  1. SSS. History of bradycardia. s/p PPM.  2. Atrial Fibrillation. Did not tolerate cardizem due to SSS. On Xarelto. He has been in and out of a fib over last few years by device check. 3. Mild bilateral carotid disease. His echo shows normal LV EF and carotids show mild disease in right and left carotids. 4. TIA. 5.  Hypotension, orthostatic  6. Asbestosis  7. Abnormal MPI 9/2017 with small region of posterior partial reversibility using pharmacological stress, consistent with branch vessel distribution ischemia. Symptoms improved with addition of Toprol XL 12.5mg.     . Retired from [employer] as . Assessment/Plan:   - CHF vs. Pneumonia, ISRAEL, increased pBNP, but no peripheral edema  - Afib  - Increased troponin (peaked), no chest pain, no acute ST changes  - Anemia receiving tx  - Elderly     Poor candidate for invasive rx. Would start with medical Rx. Off BiPAP    Echo w/ EF 55-60%, mild AoS, mild to mod TR    Some confusion    HgB improved    sCr stable; pending for today     - Holding xarelto, s/p PRBC  - Holding asa due to anemia  - Holding beta blocker due to orthostatic hypotension  - Cont Midodrine 10mg TID with hold parameters  - Currently on lasix 40mg iv bid  - No new recs today         [x]       High complexity decision making was performed in this patient at high risk for decompensation with multiple organ involvement. Subjective:     Ramon Smith denies chest pain, dyspnea. Discussed with RN events overnight.      Review of Systems:    Symptom Y/N Comments  Symptom Y/N Comments   Fever/Chills N   Chest Pain N    Poor Appetite N   Edema N    Cough N   Abdominal Pain N    Sputum N Joint Pain N    SOB/OSEGUERA N   Pruritis/Rash N    Nausea/vomit N   Tolerating PT/OT Y    Diarrhea N   Tolerating Diet Y    Constipation N   Other       Could NOT obtain due to:      Objective:      Physical Exam:    Last 24hrs VS reviewed since prior progress note. Most recent are:    Visit Vitals  /72 (BP 1 Location: Left upper arm, BP Patient Position: At rest)   Pulse 70   Temp 98.6 °F (37 °C)   Resp 16   Ht 6' 1\" (1.854 m)   Wt 63 kg (139 lb)   SpO2 97%   BMI 18.34 kg/m²       Intake/Output Summary (Last 24 hours) at 3/24/2021 5109  Last data filed at 3/24/2021 0146  Gross per 24 hour   Intake    Output 1875 ml   Net -1875 ml        General Appearance: Well developed, well nourished, alert & oriented x 1,    no acute distress. Ears/Nose/Mouth/Throat: Hearing grossly normal.  Neck: Supple. Chest: Lungs clear to auscultation bilaterally. Cardiovascular: Regular rate and rhythm, S1S2 normal, no murmur. Abdomen: Soft, non-tender, bowel sounds are active. Extremities: No edema bilaterally. Skin: Warm and dry. []         Post-cath site without hematoma, bruit, tenderness, or thrill. Distal pulses intact.     PMH/SH reviewed - no change compared to H&P    Data Review    Telemetry: paced     EKG:   [x]  No new EKG for review    Lab Data Personally Reviewed:    Recent Labs     03/24/21  0212 03/23/21  0612   WBC 8.5 7.6   HGB 8.5* 8.8*   HCT 27.1* 27.7*   * 145*     Recent Labs     03/21/21  0743   INR 1.5*   PTP 15.4*   APTT 28.8      Recent Labs     03/23/21  0612 03/22/21 2000 03/22/21  0307 03/21/21  0743     --  140 142   K 3.2* 2.7* 2.4* 3.1*     --  102 108   CO2 31  --  30 26   BUN 51*  --  53* 45*   CREA 1.93*  --  2.01* 1.87*   *  --  98 99   CA 8.3*  --  8.2* 8.1*   MG  --   --   --  2.6*     Recent Labs     03/21/21  1935 03/21/21  1207 03/21/21  0743   TROIQ 0.14* 0.24* 0.29*     Lab Results   Component Value Date/Time    Cholesterol, total 196 12/28/2012 04:00 AM HDL Cholesterol 60 12/28/2012 04:00 AM    LDL, calculated 123 (H) 12/28/2012 04:00 AM    Triglyceride 65 12/28/2012 04:00 AM    CHOL/HDL Ratio 3.3 12/28/2012 04:00 AM       Recent Labs     03/21/21  0743   *   TP 7.6   ALB 2.8*   GLOB 4.8*     No results for input(s): PH, PCO2, PO2 in the last 72 hours.     Medications Personally Reviewed:    Current Facility-Administered Medications   Medication Dose Route Frequency    potassium chloride SR (KLOR-CON 10) tablet 40 mEq  40 mEq Oral DAILY    iron sucrose (VENOFER) injection 100 mg  100 mg IntraVENous DAILY    ferrous sulfate tablet 325 mg  1 Tab Oral DAILY WITH BREAKFAST    pantoprazole (PROTONIX) tablet 40 mg  40 mg Oral ACB&D    midodrine (PROAMATINE) tablet 10 mg  10 mg Oral TID PRN    acetaminophen (TYLENOL) tablet 650 mg  650 mg Oral Q6H PRN    cefTRIAXone (ROCEPHIN) 1 g in 0.9% sodium chloride (MBP/ADV) 50 mL MBP  1 g IntraVENous Q24H    sodium chloride (NS) flush 5-40 mL  5-40 mL IntraVENous Q8H    sodium chloride (NS) flush 5-40 mL  5-40 mL IntraVENous PRN    acetaminophen (TYLENOL) tablet 650 mg  650 mg Oral Q6H PRN    Or    acetaminophen (TYLENOL) suppository 650 mg  650 mg Rectal Q6H PRN    polyethylene glycol (MIRALAX) packet 17 g  17 g Oral DAILY PRN    promethazine (PHENERGAN) tablet 12.5 mg  12.5 mg Oral Q6H PRN    Or    ondansetron (ZOFRAN) injection 4 mg  4 mg IntraVENous Q6H PRN    azithromycin (ZITHROMAX) 500 mg in 0.9% sodium chloride 250 mL (VIAL-MATE)  500 mg IntraVENous Q24H    furosemide (LASIX) injection 40 mg  40 mg IntraVENous BID         Gloria Fitzgerald III, DO

## 2021-03-24 NOTE — INTERDISCIPLINARY ROUNDS
[address] INTERDISCIPLINARY ROUNDS Cardiopulmonary Care Interdisciplinary Rounds were held today to discuss patient's plan of care and outcomes. The following members were present: NP/Physician, Pharmacy, Nursing and Case Management. PLAN OF CARE:  
Continue current treatment plan Expected Length of Stay:  3d 14h

## 2021-03-24 NOTE — PROGRESS NOTES
[address] INTERDISCIPLINARY ROUNDS    Cardiopulmonary Care Interdisciplinary Rounds were held today to discuss patient's plan of care and outcomes. The following members were present: NP/Physician, Pharmacy, Nursing and Case Management. PLAN OF CARE:   Continue current treatment plan  ? D/C to SNF Friday    Expected Length of Stay:  3d 14h

## 2021-03-25 ENCOUNTER — APPOINTMENT (OUTPATIENT)
Dept: GENERAL RADIOLOGY | Age: 86
DRG: 189 | End: 2021-03-25
Attending: GENERAL ACUTE CARE HOSPITAL
Payer: MEDICARE

## 2021-03-25 LAB
ANION GAP SERPL CALC-SCNC: 7 MMOL/L (ref 5–15)
BUN SERPL-MCNC: 64 MG/DL (ref 6–20)
BUN/CREAT SERPL: 34 (ref 12–20)
CALCIUM SERPL-MCNC: 8.4 MG/DL (ref 8.5–10.1)
CHLORIDE SERPL-SCNC: 103 MMOL/L (ref 97–108)
CO2 SERPL-SCNC: 31 MMOL/L (ref 21–32)
COVID-19 RAPID TEST, COVR: NOT DETECTED
CREAT SERPL-MCNC: 1.91 MG/DL (ref 0.7–1.3)
ERYTHROCYTE [DISTWIDTH] IN BLOOD BY AUTOMATED COUNT: 15 % (ref 11.5–14.5)
GLUCOSE BLD STRIP.AUTO-MCNC: 100 MG/DL (ref 65–100)
GLUCOSE SERPL-MCNC: 96 MG/DL (ref 65–100)
HCT VFR BLD AUTO: 27.5 % (ref 36.6–50.3)
HGB BLD-MCNC: 8.4 G/DL (ref 12.1–17)
MCH RBC QN AUTO: 29.2 PG (ref 26–34)
MCHC RBC AUTO-ENTMCNC: 30.5 G/DL (ref 30–36.5)
MCV RBC AUTO: 95.5 FL (ref 80–99)
NRBC # BLD: 0.03 K/UL (ref 0–0.01)
NRBC BLD-RTO: 0.4 PER 100 WBC
PLATELET # BLD AUTO: 148 K/UL (ref 150–400)
PMV BLD AUTO: 9.9 FL (ref 8.9–12.9)
POTASSIUM SERPL-SCNC: 3.4 MMOL/L (ref 3.5–5.1)
RBC # BLD AUTO: 2.88 M/UL (ref 4.1–5.7)
SARS-COV-2, COV2: NORMAL
SERVICE CMNT-IMP: NORMAL
SODIUM SERPL-SCNC: 141 MMOL/L (ref 136–145)
SOURCE, COVRS: NORMAL
WBC # BLD AUTO: 8.5 K/UL (ref 4.1–11.1)

## 2021-03-25 PROCEDURE — 74011250636 HC RX REV CODE- 250/636: Performed by: GENERAL ACUTE CARE HOSPITAL

## 2021-03-25 PROCEDURE — 65660000001 HC RM ICU INTERMED STEPDOWN

## 2021-03-25 PROCEDURE — 77010033678 HC OXYGEN DAILY

## 2021-03-25 PROCEDURE — 71045 X-RAY EXAM CHEST 1 VIEW: CPT

## 2021-03-25 PROCEDURE — 74011000258 HC RX REV CODE- 258: Performed by: INTERNAL MEDICINE

## 2021-03-25 PROCEDURE — 82962 GLUCOSE BLOOD TEST: CPT

## 2021-03-25 PROCEDURE — 74011250637 HC RX REV CODE- 250/637: Performed by: GENERAL ACUTE CARE HOSPITAL

## 2021-03-25 PROCEDURE — 74011250636 HC RX REV CODE- 250/636: Performed by: INTERNAL MEDICINE

## 2021-03-25 PROCEDURE — 80048 BASIC METABOLIC PNL TOTAL CA: CPT

## 2021-03-25 PROCEDURE — 87635 SARS-COV-2 COVID-19 AMP PRB: CPT

## 2021-03-25 PROCEDURE — 36415 COLL VENOUS BLD VENIPUNCTURE: CPT

## 2021-03-25 PROCEDURE — 85027 COMPLETE CBC AUTOMATED: CPT

## 2021-03-25 RX ORDER — FUROSEMIDE 10 MG/ML
40 INJECTION INTRAMUSCULAR; INTRAVENOUS DAILY
Status: DISCONTINUED | OUTPATIENT
Start: 2021-03-25 | End: 2021-03-26 | Stop reason: HOSPADM

## 2021-03-25 RX ADMIN — CEFTRIAXONE 1 G: 1 INJECTION, POWDER, FOR SOLUTION INTRAMUSCULAR; INTRAVENOUS at 09:30

## 2021-03-25 RX ADMIN — PANTOPRAZOLE SODIUM 40 MG: 40 TABLET, DELAYED RELEASE ORAL at 17:45

## 2021-03-25 RX ADMIN — AZITHROMYCIN MONOHYDRATE 500 MG: 500 INJECTION, POWDER, LYOPHILIZED, FOR SOLUTION INTRAVENOUS at 09:29

## 2021-03-25 RX ADMIN — Medication 10 ML: at 03:20

## 2021-03-25 RX ADMIN — FUROSEMIDE 40 MG: 10 INJECTION, SOLUTION INTRAMUSCULAR; INTRAVENOUS at 09:30

## 2021-03-25 RX ADMIN — PANTOPRAZOLE SODIUM 40 MG: 40 TABLET, DELAYED RELEASE ORAL at 09:30

## 2021-03-25 RX ADMIN — FERROUS SULFATE TAB 325 MG (65 MG ELEMENTAL FE) 325 MG: 325 (65 FE) TAB at 09:30

## 2021-03-25 RX ADMIN — Medication 10 ML: at 17:46

## 2021-03-25 RX ADMIN — POTASSIUM CHLORIDE 40 MEQ: 750 TABLET, FILM COATED, EXTENDED RELEASE ORAL at 09:30

## 2021-03-25 RX ADMIN — Medication 10 ML: at 22:09

## 2021-03-25 NOTE — PROGRESS NOTES
Problem: Falls - Risk of  Goal: *Absence of Falls  Description: Document Kathrin Sandoval Fall Risk and appropriate interventions in the flowsheet.   Outcome: Progressing Towards Goal  Note: Fall Risk Interventions:  Mobility Interventions: Bed/chair exit alarm, Patient to call before getting OOB    Mentation Interventions: Adequate sleep, hydration, pain control, Bed/chair exit alarm, Increase mobility    Medication Interventions: Bed/chair exit alarm, Patient to call before getting OOB    Elimination Interventions: Bed/chair exit alarm, Call light in reach, Patient to call for help with toileting needs    History of Falls Interventions: Bed/chair exit alarm, Investigate reason for fall

## 2021-03-25 NOTE — PROGRESS NOTES
Problem: Falls - Risk of  Goal: *Absence of Falls  Description: Document Leon Garvey Fall Risk and appropriate interventions in the flowsheet.   Outcome: Progressing Towards Goal  Note: Fall Risk Interventions:  Mobility Interventions: Bed/chair exit alarm, Patient to call before getting OOB    Mentation Interventions: Adequate sleep, hydration, pain control, Bed/chair exit alarm, Increase mobility    Medication Interventions: Bed/chair exit alarm, Patient to call before getting OOB    Elimination Interventions: Bed/chair exit alarm, Call light in reach, Patient to call for help with toileting needs    History of Falls Interventions: Bed/chair exit alarm, Investigate reason for fall

## 2021-03-25 NOTE — PROGRESS NOTES
Spiritual Care Assessment/Progress Note  [facility]      NAME: Yousuf Martin      MRN: [id number]  AGE: 80 y.o. SEX: male  Taoism Affiliation: Restoration   Language: English     3/25/2021     Total Time (in minutes): 24     Spiritual Assessment begun in [facility] through conversation with:         [x]Patient        [x] Family    [] Friend(s)        Reason for Consult: Initial/Spiritual assessment, patient floor     Spiritual beliefs: (Please include comment if needed)     [x] Identifies with a juan tradition:         [] Supported by a juan community:            [] Claims no spiritual orientation:           [] Seeking spiritual identity:                [] Adheres to an individual form of spirituality:           [] Not able to assess:                           Identified resources for coping:      [x] Prayer                               [x] Music                  [] Guided Imagery     [x] Family/friends                 [] Pet visits     [] Devotional reading                         [] Unknown     [] Other:                                               Interventions offered during this visit: (See comments for more details)    Patient Interventions: Affirmation of emotions/emotional suffering, Affirmation of juan, Catharsis/review of pertinent events in supportive environment, Coping skills reviewed/reinforced, Iconic (affirming the presence of God/Higher Power), Initial/Spiritual assessment, patient floor, Life review/legacy, Normalization of emotional/spiritual concerns, Prayer (actual), Prayer (assurance of)     Family/Friend(s):  Affirmation of emotions/emotional suffering, Affirmation of juan, Catharsis/review of pertinent events in supportive environment, Coping skills reviewed/reinforced, Guidance concerning next steps/process to be expected, Iconic (affirming the presence of God/Higher Power), Initial Assessment, Normalization of emotional/spiritual concerns, Prayer (actual), Prayer (assurance of), Reframing     Plan of Care:     [] Support spiritual and/or cultural needs    [] Support AMD and/or advance care planning process      [] Support grieving process   [] Coordinate Rites and/or Rituals    [] Coordination with community clergy   [] No spiritual needs identified at this time   [] Detailed Plan of Care below (See Comments)  [] Make referral to Music Therapy  [] Make referral to Pet Therapy     [] Make referral to Addiction services  [] Make referral to [facility]  [] Make referral to Spiritual Care Partner  [] No future visits requested        [x] Follow up upon further referrals     Comments: Provided initial spiritual assessment for pt Marcelle on [address] Pt daughter was at bedside. Processed LOS and validated fernando in knowledge that medical needs were perceived as less severe than previously thought. Affirmed pt desire for rest and ability to advocate for self. Affirmed perception of good care. Processed family supports and fernando of being able to have multiple family members take part in care at home rather than live with the emotional stress of having pt be away from family and isolated. Offered prayer for healing and strength. Advised  services and consult with KELI Wynn 1 MADHU Gardner 1 Provider   Paging Service [phone])

## 2021-03-25 NOTE — PROGRESS NOTES
Progress Note      3/25/2021 2:56 PM  NAME: Eyad Brambila   MRN:  [id number]   Admit Diagnosis: Acute respiratory failure with hypoxia [facility]) [J96.01]      Problem List:   3/21:  Presents with hypoxemic resp failure with bilateral infiltrates, on bipap anemia with Hg 7.1, increas trop 0.25        11/20:  Doing well; intermittent use of midodrine. No CP or SOB. Wife says his Darnelle Eaves" is worse. No palps. 5/20 (VV): Doing well. BPs better since last OV and needs midodrine less often. Takes maybe three times per week. Mild intermittent OSEGUERA is unchanged. No CP, syncope, falls or edema. 10/19:  BPs have been lower. He has a compression fx now. Memory loss and confusion becoming an issue. Dermatologist said he has poor circulation in his feet. 4/19: Follow up after adding midodrine. Isn't needing the med every day, but dizziness has improved since starting it. No CP, SOB, palpitations, edema or syncope. Foot infection is improving. 3/19:  Dizzy and LH when standing up or changing positions for the past few weeks. No syncope. No CP, SOB, orthopnea, PND, JULIAN. Gets leg cramps in the AM.  Concerned about an infection on his feet. 9/18: Doing ok. Recently diagnosed with 2 compression fractures in his thoracic spine. Also continues to have trouble with memory. Chronic SOB is stable. No lightheadedness, CP, palpitations. Goes to PT twice per week, walks dog daily. Weight down 13 lbs.  3/18: Some SOB with exertion in the cold weather, but overall doing \"very well\". He can go up and down the stairs without any issues. No CP, palpitations, syncope, edema. ASA stopped January d/t gastritis and + hemeoccult. 9/17:  Doing ok; Hgb better; seeing heme next month; inc OSEGUERA but no CP. No bleeding. Seeing naturopath for reflux. 3/17:  Doing well. Had EGD/cscope with esophagitis/gastritis. Also with chest CTA indicating moderate coronary Ca++. Remains without exertional symptoms. Active. No complaints.   GI wants to start PPI.  12/16:  feels SBO and dizzy, but not as bad as he has in the past.  Hgb recently 7.4 and 8.3 (on xarelto). Says he recently had a CXR with flui on his lungs. No orthopnea or JULIAN. No PND. Does have a NP cough. No syncope. No CP. Getting O2 at home from his PCP        Problems:  1. SSS. History of bradycardia. s/p PPM.  2. Atrial Fibrillation. Did not tolerate cardizem due to SSS. On Xarelto. He has been in and out of a fib over last few years by device check. 3. Mild bilateral carotid disease. His echo shows normal LV EF and carotids show mild disease in right and left carotids. 4. TIA. 5.  Hypotension, orthostatic  6. Asbestosis  7. Abnormal MPI 9/2017 with small region of posterior partial reversibility using pharmacological stress, consistent with branch vessel distribution ischemia. Symptoms improved with addition of Toprol XL 12.5mg.     . Retired from [employer] as . Assessment/Plan:   - CHF vs. Pneumonia, ISRAEL, increased pBNP, but no peripheral edema  - Afib  - Increased troponin (peaked), no chest pain, no acute ST changes  - Anemia receiving tx  - Elderly     Poor candidate for invasive rx. Would start with medical Rx. Off BiPAP    Echo w/ EF 55-60%, mild AoS, mild to mod TR    Some confusion; resolved    HgB improved    sCr stable; pending for today     - Holding xarelto, s/p PRBC  - Holding asa due to anemia  - Holding beta blocker due to orthostatic hypotension  - Cont Midodrine 10mg TID with hold parameters  - Currently on lasix 40mg iv bid; would send out on 80mg daily po  - Discharge tomorrow; DAUGHTER SAYS TO HOME         [x]       High complexity decision making was performed in this patient at high risk for decompensation with multiple organ involvement. Subjective:     Fany Mullen denies chest pain, dyspnea. Discussed with RN events overnight.      Review of Systems:    Symptom Y/N Comments  Symptom Y/N Comments   Fever/Chills N   Chest Pain N Poor Appetite N   Edema N    Cough N   Abdominal Pain N    Sputum N   Joint Pain N    SOB/OSEGUERA N   Pruritis/Rash N    Nausea/vomit N   Tolerating PT/OT Y    Diarrhea N   Tolerating Diet Y    Constipation N   Other       Could NOT obtain due to:      Objective:      Physical Exam:    Last 24hrs VS reviewed since prior progress note. Most recent are:    Visit Vitals  /72 (BP 1 Location: Right upper arm, BP Patient Position: At rest)   Pulse 74   Temp 97.2 °F (36.2 °C)   Resp 24   Ht 6' 1\" (1.854 m)   Wt 63 kg (139 lb)   SpO2 95%   BMI 18.34 kg/m²       Intake/Output Summary (Last 24 hours) at 3/25/2021 1300  Last data filed at 3/25/2021 0327  Gross per 24 hour   Intake    Output 1050 ml   Net -1050 ml        General Appearance: Well developed, well nourished, alert & oriented x 1,    no acute distress. Ears/Nose/Mouth/Throat: Hearing grossly normal.  Neck: Supple. Chest: Lungs clear to auscultation bilaterally. Cardiovascular: Regular rate and rhythm, S1S2 normal, no murmur. Abdomen: Soft, non-tender, bowel sounds are active. Extremities: No edema bilaterally. Skin: Warm and dry. []         Post-cath site without hematoma, bruit, tenderness, or thrill. Distal pulses intact. PMH/SH reviewed - no change compared to H&P    Data Review    Telemetry: paced     EKG:   [x]  No new EKG for review    Lab Data Personally Reviewed:    Recent Labs     03/25/21  0321 03/24/21  0212   WBC 8.5 8.5   HGB 8.4* 8.5*   HCT 27.5* 27.1*   * 146*     No results for input(s): INR, PTP, APTT, INREXT, INREXT in the last 72 hours. Recent Labs     03/25/21  0321 03/24/21  1220 03/23/21  0612    139 142   K 3.4* 3.1* 3.2*    101 105   CO2 31 31 31   BUN 64* 57* 51*   CREA 1.91* 1.92* 1.93*   GLU 96 104* 105*   CA 8.4* 8.1* 8.3*     No results for input(s): CPK, CKNDX, TROIQ in the last 72 hours.     No lab exists for component: CPKMB  Lab Results   Component Value Date/Time    Cholesterol, total 196 12/28/2012 04:00 AM    HDL Cholesterol 60 12/28/2012 04:00 AM    LDL, calculated 123 (H) 12/28/2012 04:00 AM    Triglyceride 65 12/28/2012 04:00 AM    CHOL/HDL Ratio 3.3 12/28/2012 04:00 AM       No results for input(s): AP, TBIL, TP, ALB, GLOB, GGT, AML, LPSE in the last 72 hours. No lab exists for component: SGOT, GPT, AMYP, HLPSE  No results for input(s): PH, PCO2, PO2 in the last 72 hours.     Medications Personally Reviewed:    Current Facility-Administered Medications   Medication Dose Route Frequency    furosemide (LASIX) injection 40 mg  40 mg IntraVENous DAILY    potassium chloride SR (KLOR-CON 10) tablet 40 mEq  40 mEq Oral DAILY    ferrous sulfate tablet 325 mg  1 Tab Oral DAILY WITH BREAKFAST    pantoprazole (PROTONIX) tablet 40 mg  40 mg Oral ACB&D    midodrine (PROAMATINE) tablet 10 mg  10 mg Oral TID PRN    sodium chloride (NS) flush 5-40 mL  5-40 mL IntraVENous Q8H    sodium chloride (NS) flush 5-40 mL  5-40 mL IntraVENous PRN    acetaminophen (TYLENOL) tablet 650 mg  650 mg Oral Q6H PRN    Or    acetaminophen (TYLENOL) suppository 650 mg  650 mg Rectal Q6H PRN    polyethylene glycol (MIRALAX) packet 17 g  17 g Oral DAILY PRN    promethazine (PHENERGAN) tablet 12.5 mg  12.5 mg Oral Q6H PRN    Or    ondansetron (ZOFRAN) injection 4 mg  4 mg IntraVENous Q6H PRN         Clearnce Mons III, DO

## 2021-03-25 NOTE — PROGRESS NOTES
Hospitalist Progress Note    NAME: Leopoldo Hanley   :  1931   MRN:  [id number]       Assessment / Plan:  Acute hypoxic respiratory failure POA  Suspect acute systolic CHF/ PNA  Possible pneumonia  -Not on home O2  -Continue supplemental O2 to maintain sats > 92%  -Rapid Covid negative  -Flu negative and respiratory panel negative for tested viruses  -Continue diuresis with IV Lasix  -Strict I's/O, daily weights  -Keep Aguiar to monitor I's/O's for now  -proBNP 30 K on admission  -Troponin bump in setting of heart failure and ISRAEL  -Appreciate cardiology consult  -Continue antibiotic coverage with azithromycin and ceftriaxone  -EKG NSR ST/T wave abnormality possible inferior ischemia  -Poor candidate for invasive procedures  -Medical management for now as per cardiology  -Lower suspicion of bacterial pneumonia    TTE:  Normal systolic function, EF 76-38%    CXR:  Bilateral nonspecific pneumonia is new and superimposed on chronic calcified  pleural disease and interstitial lung disease. Recommend followup PA and lateral chest views in 8-10 weeks to ensure  Resolution.     3/24:  Continue diuresis  Wean O2 as tolerated - currently on 4L NC - not on home O2  Replete K  Repeat CXR  tmr AM    3/25:  Decrease Lasix down to daily  Currently on 1L NC  Plan for SNF discharge tomorrow  SARS-CoV2 screening rapid test pending  Updated daughter at bedside    Orthostatic hypotension  -Continue midodrine with holding parameters    Hypokalemia  -Replace, repeat a.m. labs    History of A. fib, on Xarelto  History of SSS, has a pacemaker  -Continue holding Xarelto   -Continue metoprolol     Acute on chronic anemia  -Has been on iron infusion therapy previously  -Hold Xarelto  -Monitor CBC and transfuse as needed for Hb less than 7  -Continue IV PPI  -Appreciate GI consult  -Given patient frailty and tenuous respiratory status, thought procedures risk likely outweighs benefit and no overt signs of GI bleed currently    3/24:  Give IV Venofer  Monitor Hgb     Elevated creatinine, 1.87  Unsure of what the baseline  Repeat BMP tomorrow, avoid nephrotoxic medication    Memory loss  Probable underlying dementia  -Patient agitated at points, given Zyprexa  -Close monitoring, fall precautions  -May need sitter     History of HLD  History of TIA       Code Status: DNR  Surrogate Decision Maker: Wife     DVT Prophylaxis: SCD  GI Prophylaxis: not indicated     Baseline: Ambulatory     Subjective:     Chief Complaint / Reason for Physician Visit  Pleasant. Updated daughter at bedside    Discussed with RN events overnight. Review of Systems:  Symptom Y/N Comments  Symptom Y/N Comments   Fever/Chills    Chest Pain     Poor Appetite    Edema     Cough    Abdominal Pain     Sputum    Joint Pain     SOB/OSEGUERA    Pruritis/Rash     Nausea/vomit    Tolerating PT/OT     Diarrhea    Tolerating Diet     Constipation    Other       Could NOT obtain due to:  AMS     Objective:     VITALS:   Last 24hrs VS reviewed since prior progress note. Most recent are:  Patient Vitals for the past 24 hrs:   Temp Pulse Resp BP SpO2   03/25/21 0726 97.6 °F (36.4 °C) 71 20 111/75 97 %   03/25/21 0317 98.7 °F (37.1 °C) 71 26 128/85 91 %   03/24/21 2257 98.2 °F (36.8 °C) 85 23 (!) 153/84 92 %   03/24/21 2009 98.2 °F (36.8 °C) 75 22 131/65 94 %   03/24/21 1529 97.6 °F (36.4 °C) 67 16 114/73 93 %   03/24/21 1200     (!) 1 %   03/24/21 1101 97.8 °F (36.6 °C) 72 18 110/74 94 %       Intake/Output Summary (Last 24 hours) at 3/25/2021 1050  Last data filed at 3/25/2021 0327  Gross per 24 hour   Intake    Output 1150 ml   Net -1150 ml        I had a face to face encounter and independently examined this patient on 3/25/2021, as outlined below:  PHYSICAL EXAM:  General: Elderly  adult male, no acute distress   EENT:  EOMI. Anicteric sclerae. MMM  Resp:  Diminished air entry bilateral lung hsieh, bibasilar Rales.   No accessory muscle use  CV:  Regular rhythm,  No edema  GI:  Soft, Non distended, Non tender. +Bowel sounds  Neurologic:  Limited exam, moves all extremities, follows some commands, oriented to person and family room  Psych: Moderately agitated mood, appropriate affect  Skin:  No rashes. No jaundice    Reviewed most current lab test results and cultures  YES  Reviewed most current radiology test results   YES  Review and summation of old records today    NO  Reviewed patient's current orders and MAR    YES  PMH/SH reviewed - no change compared to H&P  ________________________________________________________________________  Care Plan discussed with:    Comments   Patient x    Family  x    RN x    Care Manager x    Consultant                       x Multidiciplinary team rounds were held today with , nursing, pharmacist and clinical coordinator. Patient's plan of care was discussed; medications were reviewed and discharge planning was addressed. ________________________________________________________________________  Total NON critical care TIME: 35   Minutes    Total CRITICAL CARE TIME Spent:   Minutes non procedure based      Comments   >50% of visit spent in counseling and coordination of care x    ________________________________________________________________________  Sharita Prado MD     Procedures: see electronic medical records for all procedures/Xrays and details which were not copied into this note but were reviewed prior to creation of Plan. LABS:  I reviewed today's most current labs and imaging studies.   Pertinent labs include:  Recent Labs     03/25/21  0321 03/24/21  0212 03/23/21  0612   WBC 8.5 8.5 7.6   HGB 8.4* 8.5* 8.8*   HCT 27.5* 27.1* 27.7*   * 146* 145*     Recent Labs     03/25/21  0321 03/24/21  1220 03/23/21  0612    139 142   K 3.4* 3.1* 3.2*    101 105   CO2 31 31 31   GLU 96 104* 105*   BUN 64* 57* 51*   CREA 1.91* 1.92* 1.93*   CA 8.4* 8.1* 8.3*       Signed: Anjelica Roberts, MD

## 2021-03-25 NOTE — PROGRESS NOTES
End of Shift Note    Bedside shift change report given to [employer] (oncoming nurse) by Stephanie Segal RN (offgoing nurse). Report included the following information SBAR and Kardex    Shift worked:  Day     Shift summary and any significant changes: Tolerated treatment well. On RA. Tele monitor DC at 1000. Concerns for physician to address:  NA     Zone phone for oncoming shift:          Activity:  Activity Level: Bed Rest  Number times ambulated in hallways past shift: 0  Number of times OOB to chair past shift: 0    Cardiac:   Cardiac Monitoring: Yes      Cardiac Rhythm: Paced    Access:   Current line(s): PIV     Genitourinary:   Urinary status: voiding    Respiratory:   O2 Device: Room air  Chronic home O2 use?: NO  Incentive spirometer at bedside: NO     GI:  Last Bowel Movement Date: 03/20/21  Current diet:  DIET CARDIAC Mechanical Soft  DIET ONE TIME MESSAGE  Passing flatus: YES  Tolerating current diet: YES       Pain Management:   Patient states pain is manageable on current regimen: YES    Skin:  Edgar Score: 15  Interventions: turn team, float heels and increase time out of bed    Patient Safety:  Fall Score:  Total Score: 5  Interventions: bed/chair alarm, assistive device (walker, cane, etc) and pt to call before getting OOB  High Fall Risk: Yes    Length of Stay:  Expected LOS: 3d 14h  Actual LOS: [address] RN

## 2021-03-25 NOTE — PROGRESS NOTES
Transition of Care Plan:     RUR: 16%  Disposition: [facility] or [address] [facility] SNF on Friday if stable-both facilities have acceped pt-family wants to take him home with [facility] for nursing, PT, OT, Rollator, and HHA. [facility]-will need rapid covid within 72 hours of d/c=ordered 3/25  Sign completed DDNR prior to d/c  Follow up appointments: PCP after SNF  DME needed: none  Transportation at [address] [facility] [address],  or [facility] to transportation   [place] or means to access home:   Wife has access to home     IM Medicare letter:2nd IM at d/c-done 3/24  Caregiver Contact: Wife- Robyn Hernandez- [phone]  Discharge Caregiver contacted prior to discharge?      done 3/24  -3/25 on RA  -Family will transport home mid day Friday if all in agreement.    -family will have paperwork for MD to sign regarding FMLA    I spoke with wife who states she has a ramp, grab bars, walker, cane, chair seat for steps, elevqted commode seat, and transfer wheelchair.

## 2021-03-26 VITALS
WEIGHT: 140.5 LBS | HEART RATE: 77 BPM | HEIGHT: 73 IN | RESPIRATION RATE: 35 BRPM | TEMPERATURE: 97.8 F | DIASTOLIC BLOOD PRESSURE: 53 MMHG | SYSTOLIC BLOOD PRESSURE: 93 MMHG | OXYGEN SATURATION: 92 % | BODY MASS INDEX: 18.62 KG/M2

## 2021-03-26 PROCEDURE — 74011250636 HC RX REV CODE- 250/636: Performed by: GENERAL ACUTE CARE HOSPITAL

## 2021-03-26 PROCEDURE — 2709999900 HC NON-CHARGEABLE SUPPLY

## 2021-03-26 PROCEDURE — 97535 SELF CARE MNGMENT TRAINING: CPT | Performed by: OCCUPATIONAL THERAPIST

## 2021-03-26 PROCEDURE — 97530 THERAPEUTIC ACTIVITIES: CPT | Performed by: PHYSICAL THERAPIST

## 2021-03-26 PROCEDURE — 97530 THERAPEUTIC ACTIVITIES: CPT | Performed by: OCCUPATIONAL THERAPIST

## 2021-03-26 PROCEDURE — 74011250637 HC RX REV CODE- 250/637: Performed by: GENERAL ACUTE CARE HOSPITAL

## 2021-03-26 RX ORDER — FUROSEMIDE 80 MG/1
80 TABLET ORAL DAILY
Qty: 30 TAB | Refills: 0 | Status: SHIPPED | OUTPATIENT
Start: 2021-03-26

## 2021-03-26 RX ADMIN — Medication 10 ML: at 14:00

## 2021-03-26 RX ADMIN — Medication 10 ML: at 06:26

## 2021-03-26 RX ADMIN — FUROSEMIDE 40 MG: 10 INJECTION, SOLUTION INTRAMUSCULAR; INTRAVENOUS at 09:57

## 2021-03-26 RX ADMIN — FERROUS SULFATE TAB 325 MG (65 MG ELEMENTAL FE) 325 MG: 325 (65 FE) TAB at 09:57

## 2021-03-26 RX ADMIN — POTASSIUM CHLORIDE 40 MEQ: 750 TABLET, FILM COATED, EXTENDED RELEASE ORAL at 09:57

## 2021-03-26 RX ADMIN — PANTOPRAZOLE SODIUM 40 MG: 40 TABLET, DELAYED RELEASE ORAL at 09:57

## 2021-03-26 NOTE — PROGRESS NOTES
Progress Note      3/26/2021 2:56 PM  NAME: Joao Go   MRN:  [id number]   Admit Diagnosis: Acute respiratory failure with hypoxia [facility]) [J96.01]      Problem List:   3/21:  Presents with hypoxemic resp failure with bilateral infiltrates, on bipap anemia with Hg 7.1, increas trop 0.25        11/20:  Doing well; intermittent use of midodrine. No CP or SOB. Wife says his Patricia Joseph" is worse. No palps. 5/20 (VV): Doing well. BPs better since last OV and needs midodrine less often. Takes maybe three times per week. Mild intermittent OSEGUERA is unchanged. No CP, syncope, falls or edema. 10/19:  BPs have been lower. He has a compression fx now. Memory loss and confusion becoming an issue. Dermatologist said he has poor circulation in his feet. 4/19: Follow up after adding midodrine. Isn't needing the med every day, but dizziness has improved since starting it. No CP, SOB, palpitations, edema or syncope. Foot infection is improving. 3/19:  Dizzy and LH when standing up or changing positions for the past few weeks. No syncope. No CP, SOB, orthopnea, PND, JULIAN. Gets leg cramps in the AM.  Concerned about an infection on his feet. 9/18: Doing ok. Recently diagnosed with 2 compression fractures in his thoracic spine. Also continues to have trouble with memory. Chronic SOB is stable. No lightheadedness, CP, palpitations. Goes to PT twice per week, walks dog daily. Weight down 13 lbs.  3/18: Some SOB with exertion in the cold weather, but overall doing \"very well\". He can go up and down the stairs without any issues. No CP, palpitations, syncope, edema. ASA stopped January d/t gastritis and + hemeoccult. 9/17:  Doing ok; Hgb better; seeing heme next month; inc OSEGUERA but no CP. No bleeding. Seeing naturopath for reflux. 3/17:  Doing well. Had EGD/cscope with esophagitis/gastritis. Also with chest CTA indicating moderate coronary Ca++. Remains without exertional symptoms. Active. No complaints.   GI wants to start PPI.  12/16:  feels SBO and dizzy, but not as bad as he has in the past.  Hgb recently 7.4 and 8.3 (on xarelto). Says he recently had a CXR with flui on his lungs. No orthopnea or JULIAN. No PND. Does have a NP cough. No syncope. No CP. Getting O2 at home from his PCP        Problems:  1. SSS. History of bradycardia. s/p PPM.  2. Atrial Fibrillation. Did not tolerate cardizem due to SSS. On Xarelto. He has been in and out of a fib over last few years by device check. 3. Mild bilateral carotid disease. His echo shows normal LV EF and carotids show mild disease in right and left carotids. 4. TIA. 5.  Hypotension, orthostatic  6. Asbestosis  7. Abnormal MPI 9/2017 with small region of posterior partial reversibility using pharmacological stress, consistent with branch vessel distribution ischemia. Symptoms improved with addition of Toprol XL 12.5mg.     . Retired from [email or web address] as . Assessment/Plan:   - CHF vs. Pneumonia, ISRAEL, increased pBNP, but no peripheral edema  - Afib  - Increased troponin (peaked), no chest pain, no acute ST changes  - Anemia receiving tx  - Elderly     Poor candidate for invasive rx. Would start with medical Rx. Off BiPAP    Echo w/ EF 55-60%, mild AoS, mild to mod TR    Some confusion; resolved    HgB improved    sCr stable; pending for today     - Holding xarelto, s/p PRBC  - Holding asa due to anemia  - Holding beta blocker due to orthostatic hypotension  - Cont Midodrine 10mg TID with hold parameters  - Currently on lasix 40mg iv bid; would send out on 80mg daily po  - Discharge today? To home per daughter  - Can see my NP in two weeks         [x]       High complexity decision making was performed in this patient at high risk for decompensation with multiple organ involvement. Subjective:     Brandon Aguilar denies chest pain, dyspnea. Discussed with RN events overnight.      Review of Systems:    Symptom Y/N Comments  Symptom Y/N Comments Fever/Chills N   Chest Pain N    Poor Appetite N   Edema N    Cough N   Abdominal Pain N    Sputum N   Joint Pain N    SOB/OSEGUERA N   Pruritis/Rash N    Nausea/vomit N   Tolerating PT/OT Y    Diarrhea N   Tolerating Diet Y    Constipation N   Other       Could NOT obtain due to:      Objective:      Physical Exam:    Last 24hrs VS reviewed since prior progress note. Most recent are:    Visit Vitals  /66 (BP 1 Location: Left upper arm, BP Patient Position: At rest)   Pulse 85   Temp 97.8 °F (36.6 °C)   Resp 19   Ht 6' 1\" (1.854 m)   Wt 63.7 kg (140 lb 8 oz)   SpO2 94%   BMI 18.54 kg/m²       Intake/Output Summary (Last 24 hours) at 3/26/2021 1008  Last data filed at 3/25/2021 1950  Gross per 24 hour   Intake 50 ml   Output 500 ml   Net -450 ml        General Appearance: Well developed, well nourished, alert & oriented x 1,    no acute distress. Ears/Nose/Mouth/Throat: Hearing grossly normal.  Neck: Supple. Chest: Lungs clear to auscultation bilaterally. Cardiovascular: Regular rate and rhythm, S1S2 normal, no murmur. Abdomen: Soft, non-tender, bowel sounds are active. Extremities: No edema bilaterally. Skin: Warm and dry. []         Post-cath site without hematoma, bruit, tenderness, or thrill. Distal pulses intact. PMH/SH reviewed - no change compared to H&P    Data Review    Telemetry: paced     EKG:   [x]  No new EKG for review    Lab Data Personally Reviewed:    Recent Labs     03/25/21  0321 03/24/21  0212   WBC 8.5 8.5   HGB 8.4* 8.5*   HCT 27.5* 27.1*   * 146*     No results for input(s): INR, PTP, APTT, INREXT, INREXT in the last 72 hours. Recent Labs     03/25/21  0321 03/24/21  1220    139   K 3.4* 3.1*    101   CO2 31 31   BUN 64* 57*   CREA 1.91* 1.92*   GLU 96 104*   CA 8.4* 8.1*     No results for input(s): CPK, CKNDX, TROIQ in the last 72 hours.     No lab exists for component: CPKMB  Lab Results   Component Value Date/Time    Cholesterol, total 196 12/28/2012 04:00 AM    HDL Cholesterol 60 12/28/2012 04:00 AM    LDL, calculated 123 (H) 12/28/2012 04:00 AM    Triglyceride 65 12/28/2012 04:00 AM    CHOL/HDL Ratio 3.3 12/28/2012 04:00 AM       No results for input(s): AP, TBIL, TP, ALB, GLOB, GGT, AML, LPSE in the last 72 hours. No lab exists for component: SGOT, GPT, AMYP, HLPSE  No results for input(s): PH, PCO2, PO2 in the last 72 hours.     Medications Personally Reviewed:    Current Facility-Administered Medications   Medication Dose Route Frequency    furosemide (LASIX) injection 40 mg  40 mg IntraVENous DAILY    potassium chloride SR (KLOR-CON 10) tablet 40 mEq  40 mEq Oral DAILY    ferrous sulfate tablet 325 mg  1 Tab Oral DAILY WITH BREAKFAST    pantoprazole (PROTONIX) tablet 40 mg  40 mg Oral ACB&D    midodrine (PROAMATINE) tablet 10 mg  10 mg Oral TID PRN    sodium chloride (NS) flush 5-40 mL  5-40 mL IntraVENous Q8H    sodium chloride (NS) flush 5-40 mL  5-40 mL IntraVENous PRN    acetaminophen (TYLENOL) tablet 650 mg  650 mg Oral Q6H PRN    Or    acetaminophen (TYLENOL) suppository 650 mg  650 mg Rectal Q6H PRN    polyethylene glycol (MIRALAX) packet 17 g  17 g Oral DAILY PRN    promethazine (PHENERGAN) tablet 12.5 mg  12.5 mg Oral Q6H PRN    Or    ondansetron (ZOFRAN) injection 4 mg  4 mg IntraVENous Q6H PRN         Benita Godfrey III, DO

## 2021-03-26 NOTE — DISCHARGE SUMMARY
Hospitalist Discharge Summary     Patient ID:  Keith Chen  [id number]  81 y.o.  8/13/1931  3/21/2021    PCP on record: Roxanne Chong MD    Admit date: 3/21/2021  Discharge date and time: 3/26/2021    DISCHARGE DIAGNOSIS:  See below    CONSULTATIONS:  IP CONSULT TO HOSPITALIST  IP CONSULT TO CARDIOLOGY  IP CONSULT TO GASTROENTEROLOGY  IP CONSULT TO GASTROENTEROLOGY    Excerpted HPI from H&P of Aldo Baez MD:  Keith Chen is a 80 y.o.  male with a past medical history of sick sinus syndrome has a pacemaker, A. fib, hyperlipidemia, TIA, history of GI bleed he is a transfer from [facility] ED. As per the patient, chart, patient has been experiencing increased dyspnea and cough for past several days. And this morning he acutely got short of breath, when EMS arrived he was saturating in 60%, was placed on CPAP by EMS, he was given Nitropaste and 40 of Lasix, and transferred over to [facility] ER. Over there he was placed on BiPAP, chest x-ray showed bilateral patchy opacities, troponin was 0.25, normal white count, creatinine 1.7. Elevated proBNP. He was given azithromycin and Rocephin over there and was transferred here in the ED for the higher level of care.    ______________________________________________________________________  DISCHARGE SUMMARY/HOSPITAL COURSE:  for full details see H&P, daily progress notes, labs, consult notes.      Acute hypoxic respiratory failure POA - resolved  Suspect acute systolic CHF/ PNA - improved  Possible pneumonia - resolved  -Not on home O2  -Continue supplemental O2 to maintain sats > 92%  -Rapid Covid negative  -Flu negative and respiratory panel negative for tested viruses  -Continue diuresis with IV Lasix  -Strict I's/O, daily weights  -Keep Aguiar to monitor I's/O's for now  -proBNP 30 K on admission  -Troponin bump in setting of heart failure and ISRAEL  -Appreciate cardiology consult  -Continue antibiotic coverage with azithromycin and ceftriaxone  -EKG NSR ST/T wave abnormality possible inferior ischemia  -Poor candidate for invasive procedures  -Medical management for now as per cardiology  -Lower suspicion of bacterial pneumonia     TTE:  Normal systolic function, EF 17-37%     CXR:  Bilateral nonspecific pneumonia is new and superimposed on chronic calcified  pleural disease and interstitial lung disease. Recommend followup PA and lateral chest views in 8-10 weeks to ensure  Resolution. 3/24:  Continue diuresis  Wean O2 as tolerated - currently on 4L NC - not on home O2  Replete K  Repeat CXR  tmr AM     3/25:  Decrease Lasix down to daily  Currently on 1L NC  Plan for SNF discharge tomorrow  SARS-CoV2 screening rapid test pending  Updated daughter at bedside    3/26:  Pt stable for discharge home with family today  They do not want SNF   Updated daughter and pt's wife - they are eager to take him home today   Pt saturating well on RA  Unable to tolerate BB due to hypotension  Unable to tolerate Xarelto given anemia requiring pRBC transfusion, and advancing chronic medical conditions.     Orthostatic hypotension  -Continue midodrine with holding parameters     Hypokalemia  -Replace, repeat a.m. labs     History of A. fib, on Xarelto  History of SSS, has a pacemaker  -Continue holding Xarelto   -Continue metoprolol    3/26:  Resume home meds     Acute on chronic anemia  -Has been on iron infusion therapy previously  -Hold Xarelto  -Monitor CBC and transfuse as needed for Hb less than 7  -Continue IV PPI  -Appreciate GI consult  -Given patient frailty and tenuous respiratory status, thought procedures risk likely outweighs benefit and no overt signs of GI bleed currently     3/24:  Give IV Venofer  Monitor Hgb    3/25:  Hgb has improved with Venofer  Pt to follow up with PCP     Elevated creatinine, 1.87  Unsure of what the baseline but has stayed at roughly 1.9.  Likely has CKD3     Memory loss  Probable underlying dementia  History of HLD  History of TIA  _______________________________________________________________________  Patient seen and examined by me on discharge day. Pertinent Findings:  Gen:    Not in distress  Chest: Clear lungs  CVS:   Regular rhythm. No edema  Abd:  Soft, not distended, not tender  Neuro:  Alert, pleasant  _______________________________________________________________________  DISCHARGE MEDICATIONS:   Current Discharge Medication List      START taking these medications    Details   furosemide (LASIX) 80 mg tablet Take 1 Tab by mouth daily. Qty: 30 Tab, Refills: 0         CONTINUE these medications which have NOT CHANGED    Details   cholecalciferol, vitamin D3, (Vitamin D3) 50 mcg (2,000 unit) tab Take 2,000 Units by mouth daily. OTHER Take 1 Cap by mouth daily. Heme Iron SAP      ferrous sulfate (Slow Fe) 142 mg (45 mg iron) ER tablet Take 45 mg by mouth Daily (before breakfast). ALOE VERA PO Take 2 oz by mouth daily. Aloe Vera Juice - take 2 ounces by mouth daily      midodrine (PROAMATINE) 10 mg tablet Take 10 mg by mouth three (3) times daily as needed. When SBP <110       multivitamin (ONE A DAY) tablet Take 1 Tab by mouth daily. STOP taking these medications       rivaroxaban (XARELTO) 15 mg tab tablet Comments:   Reason for Stopping:                 Patient Follow Up Instructions:    Activity: Activity as tolerated  Diet: Resume previous diet  Wound Care: None needed        Follow-up Information     Follow up With Specialties Details Why Contact Info    Frank Glasgow MD Family Medicine On 4/2/2021 For hospital follow up appointment at 10:00AM [place] [id number]  [phone]      [address]         ________________________________________________________________    Risk of deterioration: Moderate    Condition at Discharge:  Stable  __________________________________________________________________    Disposition  Home with family and home health services    ____________________________________________________________________    Code Status: DNR/DNI  ___________________________________________________________________      Total time in minutes spent coordinating this discharge (includes going over instructions, follow-up, prescriptions, and preparing report for sign off to her PCP) :  >30 minutes    Signed:  Arti Schultz MD

## 2021-03-26 NOTE — PROGRESS NOTES
End of Shift Note    Bedside shift change report given to Vannesa (oncoming nurse) by Eliza Cain (offgoing nurse).  Report included the following information SBAR    Shift worked:  attempted to get out of bed several times, easily redirected.     Shift summary and any significant changes:            Concerns for physician to address:        Zone phone for oncoming shift:           Activity:  Activity Level: Bed Rest  Number times ambulated in hallways past shift: 0  Number of times OOB to chair past shift: 0    Cardiac:   Cardiac Monitoring: Yes      Cardiac Rhythm: Paced    Access:   Current line(s): PIV     Genitourinary:   Urinary status: voiding    Respiratory:   O2 Device: Room air  Chronic home O2 use?: NO  Incentive spirometer at bedside: NO     GI:  Last Bowel Movement Date: 03/25/21  Current diet:  DIET CARDIAC Mechanical Soft  DIET ONE TIME MESSAGE  Passing flatus: YES  Tolerating current diet: YES       Pain Management:   Patient states pain is manageable on current regimen: YES    Skin:  Edgar Score: 14  Interventions: float heels    Patient Safety:  Fall Score: Total Score: 5  Interventions: assistive device (walker, cane, etc)  High Fall Risk: Yes    Length of Stay:  Expected LOS: 3d 14h  Actual LOS: 5      Eliza Cain

## 2021-03-26 NOTE — PROGRESS NOTES
[facility] INTERDISCIPLINARY ROUNDS    Cardiopulmonary Care Interdisciplinary Rounds were held today to discuss patient's plan of care and outcomes. The following members were present: NP/Physician, Pharmacy, Nursing and Case Management. PLAN OF CARE:   Plan to discharge home today with [facility].      Expected Length of Stay:  3d 14h

## 2021-03-26 NOTE — PROGRESS NOTES
Problem: Falls - Risk of  Goal: *Absence of Falls  Description: Document Raúl Cea Fall Risk and appropriate interventions in the flowsheet.   Outcome: Progressing Towards Goal  Note: Fall Risk Interventions:  Mobility Interventions: Bed/chair exit alarm    Mentation Interventions: Bed/chair exit alarm, Door open when patient unattended    Medication Interventions: Bed/chair exit alarm, Evaluate medications/consider consulting pharmacy, Patient to call before getting OOB, Teach patient to arise slowly    Elimination Interventions: Bed/chair exit alarm, Patient to call for help with toileting needs, Call light in reach, Urinal in reach    History of Falls Interventions: Bed/chair exit alarm, Investigate reason for fall

## 2021-03-26 NOTE — PROGRESS NOTES
Problem: Self Care Deficits Care Plan (Adult)  Goal: *Acute Goals and Plan of Care (Insert Text)  Description: FUNCTIONAL STATUS PRIOR TO ADMISSION: Patient's wife indicates that he was mostly able to manage basic self-care, but she has been assisting more than usual over the past week. Patient does have some dementia (per daughter). Uses a cane for functional mobility. HOME SUPPORT: Lives with wife. Supportive daughter. Occupational Therapy Goals  Initiated 3/22/2021  1. Patient will perform grooming standing at the sink with minimal assistance/contact guard assist within 7 day(s). 2.  Patient will perform upper body dressing with supervision and cues within 7 day(s). 3.  Patient will perform lower body dressing with minimal assistance/contact guard assist within 7 day(s). 4.  Patient will perform toilet transfers with minimal assistance within 7 day(s). 5.  Patient will perform all aspects of toileting with minimal assistance within 7 day(s). Outcome: Progressing Towards Goal    OCCUPATIONAL THERAPY TREATMENT  Patient: Gladys Qureshi (89 y.o. male)  Date: 3/26/2021  Diagnosis: Acute respiratory failure with hypoxia ([facility] [address] [J96.01] <principal problem not specified>       Precautions: Fall  Chart, occupational therapy assessment, plan of care, and goals were reviewed. ASSESSMENT  Patient remains significantly confused, but he is cooperative and able to follow 1 step commands for active participation. Overall he was supervision to min A for bed mobility, he was mod A of 2 sit to stand and he was supervision to mod A for seated dressing ADLs. Patient orthostatic in standing with a BP of 78/35 and a HR in the 130s, preventing progression of OT session. Patient also remains greatly limited by continued GW, decreased activity tolerance, decreased safety awareness, decreased problem solving, decreased attention and decreased balance which impedes his functional performance.  At this time he continues to benefit from acute OT and will need HHOT, PT and 24 hour assistance, due to family preferring to take him home over having him go to SNF rehab at discharge. PLAN :  Patient continues to benefit from skilled intervention to address the above impairments. Continue treatment per established plan of care. to address goals. Recommendation for discharge: (in order for the patient to meet his/her long term goals)  Therapy up to 5 days/week in SNF setting, but family is preferring take patient home. He will need HHOT, PT and 24 hour assistance. Equipment recommendations for successful discharge (if) home:Hospital bed, bed pan. Patient has all other needed DME. OBJECTIVE DATA SUMMARY:   Cognitive/Behavioral Status:  Neurologic State: Alert  Orientation Level: Disoriented to place; Disoriented to situation;Disoriented to time;Oriented to person  Cognition: Decreased attention/concentration;Decreased command following; Impaired decision making;Poor safety awareness;Memory loss        Safety/Judgement: Decreased awareness of need for safety    Functional Mobility and Transfers for ADLs:  Bed Mobility:  Rolling: Minimum assistance  Supine to Sit: Minimum assistance;Assist x1  Sit to Supine: Supervision  Scooting: Supervision; Additional time    Transfers:  Sit to Stand: Moderate assistance of 2           Balance:  Sitting: Intact  Sitting - Static: Good (unsupported)  Sitting - Dynamic: Good (unsupported)  Standing: Impaired  Standing - Static: Constant support; Fair  Standing - Dynamic : (not tested)    ADL Intervention:    Upper Body Dressing Assistance  Shirt simulation with hospital gown: Moderate assistance; Compensatory technique training  Cues: Tactile cues provided;Verbal cues provided;Visual cues provided    Lower Body Dressing Assistance  Socks: Supervision;Set-up(to pull up socks via crossed leg technique)    Cognitive Retraining  Problem Solving: General alternative solution; Identifying the problem;Deductive reason  Organizing/Sequencing: Two step sequence  Attention to Task: Distractibility; Single task  Following Commands: Follows one step commands/directions  Safety/Judgement: Decreased awareness of need for safety  Cues: Tactile cues provided;Verbal cues provided;Visual cues provided      Activity Tolerance:   Poor  Patient orthostatic with expected resultant tachycardia to the 130s in standing. BP sittin/65  BP standin/35  BP supine: 94/64  Nursing notified of orthostatic BP.     After treatment patient left in no apparent distress:   Supine in bed, Call bell within reach, Bed / chair alarm activated, and Caregiver / family present    COMMUNICATION/COLLABORATION:   The patients plan of care was discussed with: Physical Therapist and Registered Nurse    Milka Elizabeth OTR/L  Time Calculation: 29 mins

## 2021-03-26 NOTE — PROGRESS NOTES
Problem: Mobility Impaired (Adult and Pediatric)  Goal: *Acute Goals and Plan of Care (Insert Text)  Description: FUNCTIONAL STATUS PRIOR TO ADMISSION: Patient was modified independent using a single point cane and wife for support for functional mobility. Pt's spouse at bedside and reported pt's mobility was declining over the past 5-6 days. Pt with history of a fall in Dec. 2020. Pt with cognitive impairment at baseline    [address]: The patient lived with spouse and was requiring increased assistance for mobility ~5-6 days leading up to admission. Physical Therapy Goals  Initiated 3/22/2021  1. Patient will move from supine to sit and sit to supine  in bed with minimal assistance/contact guard assist within 7 day(s). 2.  Patient will transfer from bed to chair and chair to bed with minimal assistance/contact guard assist using the least restrictive device within 7 day(s). 3.  Patient will perform sit to stand with minimal assistance/contact guard assist within 7 day(s). 4.  Patient will ambulate with minimal assistance/contact guard assist for 25 feet with the least restrictive device within 7 day(s). Outcome: Progressing Towards Goal   PHYSICAL THERAPY TREATMENT  Patient: Tika Perdue (63 y.o. male)  Date: 3/26/2021  Diagnosis: [facility] with [facility] [J96.01] <principal problem not specified>       Precautions: Fall  Chart, physical therapy assessment, plan of care and goals were reviewed. ASSESSMENT  Patient continues with skilled PT services and is progressing towards goals. Patient overall limited by low activity tolerance, SOB, gait instability and impaired transfers. Patient currently needing modA to come to EOB and modA to come to standing. Patient very SOB with activity and appears very taxed with any EOB activity. Daughter present during session and educated on assist level needed as well as use of gait belt.   Patient very orthostatic in sitting and cannot tolerate much activity. Daughter still prefers patient DC home but anticipate he will need medical transport home as well as HH PT follow. BP:  78/35 in standing     Other factors to consider for discharge: at risk for falls, below baseline, orthostatic when upright         PLAN :  Patient continues to benefit from skilled intervention to address the above impairments. Continue treatment per established plan of care. to address goals. Recommendation for discharge: (in order for the patient to meet his/her long term goals)  Therapy up to 5 days/week in SNF setting, however, daughter prefers to take patient home. Will need HH PT follow up and suggest medical transport home      IF patient discharges home will need the following DME: patient owns DME required for discharge       SUBJECTIVE:   Patient stated Bing Lose do you need me to do?     OBJECTIVE DATA SUMMARY:   Critical Behavior:  Neurologic State: Alert  Orientation Level: Disoriented to time, Disoriented to place, Oriented to person, Disoriented to situation  Cognition: Follows commands  Safety/Judgement: Decreased awareness of environment, Decreased awareness of need for safety, Decreased awareness of need for assistance, Decreased insight into deficits  Functional Mobility Training:  Bed Mobility:  Rolling: Minimum assistance  Supine to Sit: Minimum assistance;Assist x1  Sit to Supine: Supervision  Scooting: Supervision; Additional time        Transfers:  Sit to Stand: Moderate assistance  Stand to Sit: Moderate assistance(to control stand to sit)                             Balance:  Sitting: Intact  Sitting - Static: Good (unsupported)  Sitting - Dynamic: Good (unsupported)  Standing: Impaired  Standing - Static: Constant support; Fair  Standing - Dynamic : (not tested)    Pain Rating:  No c/o pain    Activity Tolerance:   Poor, observed SOB with activity, and signs and symptoms of orthostatic hypotension    After treatment patient left in no apparent distress:   Supine in bed, Call bell within reach, and Caregiver / family present    COMMUNICATION/COLLABORATION:   The patients plan of care was discussed with: Physical therapist, Occupational therapist, and Registered nurse.      Leola Armendariz PT, DPT   Time Calculation: 26 mins

## 2021-03-26 NOTE — PROGRESS NOTES
Dtr Kedar Amaya requests [facility] transport and is aware insurance may not cover the cost.  Please send emar, d/c instructions, facesheet//[facility] form//DDNR(on clipboard), and RX. DDNR was completed with copy on chart and 5 copies given to dtr. Rollator in the room and Kedar Amaya has agreed to the $50 co pay. I made an sampson't with Dr Benavides Dear for 10:45 April 9th with REY Castellanos at the [facility] office(I spoke with Kayli). I place info on AVS.      Pt is ready for dc from CM perspective    Transition of Care Plan:     RUR: 16%  Disposition: [facility] or [facility] SNF on Friday if stable-both facilities have acceped pt-family wants to take him home with [facility] for nursing, PT, OT, Rollator, and HHA. Will send to [facility] and [facility](who can accept for nursing, PT, OT, and HHA). SNF-will need rapid covid within 72 hours of d/c=ordered 3/25  Sign completed DDNR prior to d/c  Follow up appointments: PCP after SNF  DME needed: none  Transportation at [address]  [facility], dtr,  or [facility] to transportation   [place] or means to access home:   Wife has access to home     IM Medicare letter:2nd IM at d/c-done 3/24  Caregiver Contact: Wife- Simone Hua- [phone]  Discharge Caregiver contacted prior to discharge?      done 3/24  -3/25 on RA  -Family will transport home mid day Friday if all in agreement.    -family will have paperwork for MD to sign regarding FMLA     I spoke with wife who states she has a ramp, grab bars, walker, cane, chair seat for steps, elevqted commode seat, and transfer wheelchair.

## 2021-03-26 NOTE — PROGRESS NOTES
DISCHARGE SUMMARY FROM [facility] NURSE    The patient is stable for discharge. I have reviewed the discharge instructions with the patient and caregiver. The patient and caregiver verbalized understanding. All questions were fully answered. The patient and caregiver verbalized no complaints. Hard scripts and medication handouts were given and reviewed with the patient and caregiver. Appropriate educational materials and medication side effects teaching were provided also provided. Cardiac monitor and IV line(s) were removed. The following personal items collected during admission were returned to the patient/family  Home medications: n/a  Dental Appliance: Dental Appliances: Partials, Lowers  Vision: Visual Aid: None  Hearing Aid:    Jewelry: Jewelry: None  Clothing: Clothing: None  Other Valuables: Other Valuables: None  Valuables sent to safe:      OR _________________________________________________________________________________________    There were no personal belongings, valuables or home medications left at patient's bedside,  or safe.

## 2021-03-26 NOTE — PROGRESS NOTES
03/25/21 2352   Vitals   Temp 98.2 °F (36.8 °C)   Temp Source Oral   Pulse (Heart Rate) 74   Heart Rate Source Monitor   Resp Rate 30   O2 Sat (%) 93 %   Level of Consciousness Alert   BP (!) 110/50   MAP (Calculated) 70   BP 1 Location Right upper arm   BP 1 Method Automatic   BP Patient Position At rest   MEWS Score 3   Oxygen Therapy   O2 Device Room air   Patient stable.

## 2021-03-26 NOTE — PROGRESS NOTES
03/26/21 1441   Vital Signs   Temp 97.8 °F (36.6 °C)   Temp Source Oral   Pulse (Heart Rate) 86   Heart Rate Source Monitor   Resp Rate 28   O2 Sat (%) 93 %   Level of Consciousness Alert   BP 90/62   MAP (Calculated) 71   BP 1 Method Automatic   BP 1 Location Left upper arm   BP Patient Position At rest   MEWS Score 3   MD notified. No new orders received at the moment. MD stated patient is stable for discharge.

## 2021-03-26 NOTE — DISCHARGE INSTRUCTIONS
Patient Education   Furosemide (By mouth)   Furosemide (rgxy-JR-xe-mide)  Treats fluid retention (edema) and high blood pressure. This medicine is a diuretic (water pill). Brand Name(s): Active-Medicated Specimen Collection Kit, Diuscreen Multi-Drug Medicated Test Kit, Lasix, RX-Specimen Collection Kit, Specimen Collection Kit   There may be other brand names for this medicine. When This Medicine Should Not Be Used: This medicine is not right for everyone. Do not use it if you had an allergic reaction to furosemide. How to Use This Medicine:   Liquid, Tablet  · Take your medicine as directed. Your dose may need to be changed several times to find what works best for you. · You may take this medicine with food if it upsets your stomach. · Oral liquid: Measure the oral liquid medicine with a marked measuring spoon, oral syringe, or medicine cup. · Tablet: Swallow the tablet whole. Do not crush, break, or chew it. · Missed dose: Take a dose as soon as you remember. If it is almost time for your next dose, wait until then and take a regular dose. Do not take extra medicine to make up for a missed dose. · Store the medicine in a closed container at room temperature, away from heat, moisture, and direct light. Drugs and Foods to Avoid:   Ask your doctor or pharmacist before using any other medicine, including over-the-counter medicines, vitamins, and herbal products. · Some medicines can affect how furosemide works.  Tell your doctor if you are also using any of the following:  ¨ Cisplatin, cyclosporine, digoxin, ethacrynic acid, licorice, lithium, methotrexate, or phenytoin  ¨ Adrenocorticotropic hormone (ACTH)  ¨ Laxative  ¨ Medicine to treat an infection  ¨ NSAID pain or arthritis medicine (including aspirin, diclofenac, ibuprofen, indomethacin, naproxen)  ¨ Other blood pressure medicines  ¨ Steroid medicine (including dexamethasone, hydrocortisone, methylprednisolone, prednisolone, prednisone)  ¨ Thyroid medicine  · If you also take sucralfate, allow at least 2 hours between the time you take furosemide and the time you take sucralfate. · Alcohol, narcotic pain medicine, or sleeping pills may cause you to feel more lightheaded, dizzy, or faint when used with this medicine. Warnings While Using This Medicine:   · Tell your doctor if you are pregnant or breastfeeding, or if you have kidney disease, liver disease (including cirrhosis), diabetes, gout, low blood pressure, lupus, an enlarged prostate, trouble urinating, or an allergy to sulfa drugs. Tell your doctor if you are on a low-salt diet. · This medicine may cause the following problems:   ¨ Low levels of minerals in your blood, such as potassium and sodium  ¨ Blood sugar level changes  ¨ Hearing problems  · Make sure any doctor or dentist who treats you knows that you are using this medicine. · This medicine could lower your blood pressure too much, especially when you first use it or if you are dehydrated. Stand or sit up slowly if you feel lightheaded or dizzy. · This medicine may make your skin more sensitive to sunlight. Wear sunscreen. Do not use sunlamps or tanning beds. · Your doctor will do lab tests at regular visits to check on the effects of this medicine. Keep all appointments. · Keep all medicine out of the reach of children. Never share your medicine with anyone.   Possible Side Effects While Using This Medicine:   Call your doctor right away if you notice any of these side effects:  · Allergic reaction: Itching or hives, swelling in your face or hands, swelling or tingling in your mouth or throat, chest tightness, trouble breathing  · Blistering, peeling, red skin rash  · Confusion, weakness, muscle twitching  · Dry mouth, increased thirst, muscle cramps, uneven heartbeat  · Sudden and severe stomach pain, nausea, vomiting, fever, lightheadedness  · Hearing loss, ringing in the ears  · Lightheadedness, dizziness, fainting  · Severe diarrhea  · Unusual bleeding or bruising  · Yellow skin or eyes  If you notice these less serious side effects, talk with your doctor:   · Loss of appetite, stomach cramps  If you notice other side effects that you think are caused by this medicine, tell your doctor. Call your doctor for medical advice about side effects. You may report side effects to FDA at 3-363-TYQ-0096  © 2017 [facility] Information is for End User's use only and may not be sold, redistributed or otherwise used for commercial purposes. The above information is an  only. It is not intended as medical advice for individual conditions or treatments. Talk to your doctor, nurse or pharmacist before following any medical regimen to see if it is safe and effective for you.

## 2021-03-27 LAB
BACTERIA SPEC CULT: NORMAL
SERVICE CMNT-IMP: NORMAL

## 2021-03-29 ENCOUNTER — PATIENT OUTREACH (OUTPATIENT)
Dept: CASE MANAGEMENT | Age: 86
End: 2021-03-29

## 2021-03-29 NOTE — PROGRESS NOTES
Care Transitions Initial Follow Up Call    Call within 2 business days of discharge: Yes     Patient: Roberto Blake Patient : 1931 MRN: [id number]    Last Discharge [address]       Complaint Diagnosis Description Type Department Provider    3/21/21 Respiratory Distress Acute respiratory failure with hypoxia ([facility] . .. ED to Hosp-Admission (Discharged) (ADMIT) Ji Alvares MD; Jessica Cortez. .. Was this an external facility discharge? No    Challenges to be reviewed by the provider     - Needs f/u CXR in 8-10 weeks  - follow up labs for anemia and Creatine. - now on Lasix, discussed and encouraged daily weights if patient is able to safely stand on scale           Method of communication with provider : chart routing    Discussed COVID-19 related testing which was available at this time. Test results were negative. Patient informed of results, if available? yes     Advance Care Planning:   Does patient have an Advance Directive: yes, reviewed and current     Inpatient Readmission Risk score: Unplanned Readmit Risk Score: 12    Was this a readmission? no   Patient stated reason for the admission: n/a    Patients top risk factors for readmission: lack of knowledge about disease, medical condition, multiple health system providers and utilization of services, declined SNF   Interventions to address risk factors: Scheduled appointment with PCP-, Scheduled appointment with Specialist-, Obtained and reviewed discharge summary and/or continuity of care documents, Communication with home health agencies or other community services the patient is currently using-[facility] and Education of patient/family/caregiver/guardian to support self-management-see goals    Care Transition Nurse (CTN) contacted the family by telephone to perform post hospital discharge assessment. Verified name and  with family as identifiers. Provided introduction to self, and explanation of the CTN role.      CTN reviewed discharge instructions, medical action plan and red flags with family who verbalized understanding. Were discharge instructions available to patient? no. Reviewed appropriate site of care based on symptoms and resources available to patient including: PCP, Specialist, Urgent [address] and When to call 911. Family given an opportunity to ask questions and does not have any further questions or concerns at this time. The family agrees to contact the PCP office for questions related to their healthcare. Medication reconciliation was performed with family, who verbalizes understanding of administration of home medications. Advised obtaining a 90-day supply of all daily and as-needed medications. Referral to Pharm D needed: no     START taking these medications     Details   furosemide (LASIX) 80 mg tablet Take 1 Tab by mouth daily. Qty: 30 Tab, Refills: 0         STOP taking these medications         rivaroxaban (XARELTO) 15 mg tab tablet Comments:   Reason for Stopping:                      Home Health/Outpatient orders at discharge: PT, OT and [address]: [facility]  Date of initial visit: scheduled for 3/30    Durable Medical Equipment ordered at [address] [place] [address]: [facility]  [facility] received: prior to d/c  ramp, grab bars, walker, cane, chair seat for steps, elevqted commode seat, and transfer wheelchair    Covid Risk Education    Patient has following risk factors of: heart failure. Education provided regarding infection prevention, and signs and symptoms of COVID-19 and when to seek medical attention with family who verbalized understanding. Discussed exposure protocols and quarantine From CDC: Are you at higher risk for severe illness?  and given an opportunity for questions and concerns. The family agrees to contact the COVID-19 hotline [phone] or PCP office for questions related to COVID-19.      For more information on steps you can take to protect yourself, see CDC's How to Protect Yourself     Was patient discharged with a pulse oximeter? no Discussed and confirmed pulse oximeter discharge instructions and when to notify provider or seek emergency care. (patient bought his own)    Patient/family/caregiver given information for [employer] and agrees to enroll yes  Email: [email or web address] [employer]  Phone: [phone]    Discussed follow-up appointments. If no appointment was previously scheduled, appointment scheduling offered: yes Is follow up appointment scheduled within 7 days of discharge? yes   [facility] follow up appointment(s): No future appointments. Non-[facility] follow up appointment(s): Dr. Heidi Verdin 4/2, Dr. Sigifredo Paulson 4/9    Plan for follow-up call in 7-10 days based on severity of symptoms and risk factors. Plan for next call: symptom management-assess s/s and follow up appointment-confirm attendance  CTN provided contact information for future needs. Goals Addressed                 This Visit's Progress     Prevent complications post hospitalization. 03/29/21    - Spoke to patients wife, verified on hipaa. - patient will see PCP on 4/2 and Dr. Mei Read on 4/9.   - patient is open to [facility], [facility] and confirmed that patient was scheduled to be seen on 3/30.   - wife reports that patient was feeling short of breath Friday, they called EMS, 02 had returned to 95% once they were evaluating patient and patient declined to go to ER at that time so patient stayed home. Family has since obtained a pulse ox and is monitoring 02, it was 95% at time of call and has not been below 90% except one time when they were turning the patient, quickly returned to above 90%. They will keep log to bring to their appt. If it drops below 90% and does not quickly return to above 90% they will contact MD or if it drops below 85% they will contact.    - Reminded family to turn patient every 2 hours while in bed, monitor bony prominences, provide pressure relief every hour while sitting down, float heels, and use pillows to relieve pressure. - patient needs follow up CXR in 8-10 weeks. Discussed deep breathing exercises and patient has been performing them. - Reviewed other red flag s/s with patient, nausea, vomiting, inability to pass urine/stool, SOB, mental status change, chest pain, fever.    - discussed low sodium diet now that patient is on lasix. Also discussed daily weights, wife and family will attempt to weigh daily if patient can safely stand on the scale. They will keep log and notify MD/CTN of gain greater then 3 lbs in a day or 5 lbs in a week. Family will also monitor for edema, SOB and chest pain. - out of service area for dispatch health     Patient will contact Md/CTN if red flag s/s arise. CTN to f/u ~ 7-10 days.  AR

## 2021-04-13 ENCOUNTER — PATIENT OUTREACH (OUTPATIENT)
Dept: CASE MANAGEMENT | Age: 86
End: 2021-04-13

## 2021-04-13 RX ORDER — ACETAMINOPHEN 325 MG/1
650 TABLET ORAL ONCE
Status: COMPLETED | OUTPATIENT
Start: 2021-04-15 | End: 2021-04-15

## 2021-04-13 RX ORDER — DIPHENHYDRAMINE HCL 25 MG
25 CAPSULE ORAL ONCE
Status: COMPLETED | OUTPATIENT
Start: 2021-04-15 | End: 2021-04-15

## 2021-04-13 RX ORDER — FUROSEMIDE 10 MG/ML
20 INJECTION INTRAMUSCULAR; INTRAVENOUS ONCE
Status: COMPLETED | OUTPATIENT
Start: 2021-04-15 | End: 2021-04-15

## 2021-04-13 NOTE — PROGRESS NOTES
Goals      Prevent complications post hospitalization. 03/29/21    - Spoke to patients wife, verified on hipaa. - patient will see PCP on 4/2 and Dr. Liyah Segura on 4/9.   - patient is open to [facility], CTN contacted Steve and confirmed that patient was scheduled to be seen on 3/30.   - wife reports that patient was feeling short of breath Friday, they called EMS, 02 had returned to 95% once they were evaluating patient and patient declined to go to ER at that time so patient stayed home. Family has since obtained a pulse ox and is monitoring 02, it was 95% at time of call and has not been below 90% except one time when they were turning the patient, quickly returned to above 90%. They will keep log to bring to their appt. If it drops below 90% and does not quickly return to above 90% they will contact MD or if it drops below 85% they will contact. - Reminded family to turn patient every 2 hours while in bed, monitor bony prominences, provide pressure relief every hour while sitting down, float heels, and use pillows to relieve pressure. - patient needs follow up CXR in 8-10 weeks. Discussed deep breathing exercises and patient has been performing them. - Reviewed other red flag s/s with patient, nausea, vomiting, inability to pass urine/stool, SOB, mental status change, chest pain, fever.    - discussed low sodium diet now that patient is on lasix. Also discussed daily weights, wife and family will attempt to weigh daily if patient can safely stand on the scale. They will keep log and notify MD/CTN of gain greater then 3 lbs in a day or 5 lbs in a week. Family will also monitor for edema, SOB and chest pain. - out of service area for dispatch health     Patient will contact Md/CTN if red flag s/s arise. CTN to f/u ~ 7-10 days. AR       04/13/21  - Spoke to patients wife today, verifed on hipaa. Wife reports that patients Hgb was low so he is due for blood transfusion this week.  Patient saw an NP in Dr. Argueta Asp office on 4/12 and Hgb was 7.4 (wife is pretty sure). Patient attended appt with Dr. Av Ward on 4/2 and it was 8.9.  - before hgb dropped patient was dong better, he was able to get up and go to the bathroom and be in his chair during the day. Patient is very fatigue she reports from the low hgb. - patient still gets 02 checked, it did drop at one time yesterday at the doctor office into the 80s, it returned to above 90. Patient is still open to [facility]. His fatigue and SOB started occurring on Sunday. She will keep log of 02 and also activity when they check 02.   - other then low Hgb patient was doing well she stated. Patient will contact Md/CTN if red flag s/s arise. CTN to f/u ~ 7-10 days.  AR

## 2021-04-14 ENCOUNTER — HOSPITAL ENCOUNTER (OUTPATIENT)
Dept: INFUSION THERAPY | Age: 86
Discharge: HOME | End: 2021-04-14
Payer: MEDICARE

## 2021-04-14 VITALS
HEART RATE: 72 BPM | DIASTOLIC BLOOD PRESSURE: 55 MMHG | SYSTOLIC BLOOD PRESSURE: 100 MMHG | TEMPERATURE: 96.9 F | RESPIRATION RATE: 18 BRPM

## 2021-04-14 LAB — HISTORY CHECKED?,CKHIST: NORMAL

## 2021-04-14 PROCEDURE — 86900 BLOOD TYPING SEROLOGIC ABO: CPT

## 2021-04-14 PROCEDURE — 36415 COLL VENOUS BLD VENIPUNCTURE: CPT

## 2021-04-14 PROCEDURE — 86920 COMPATIBILITY TEST SPIN: CPT

## 2021-04-14 NOTE — PROGRESS NOTES
[facility] Progress Note    Date: 2021    Name: Iftikhar Pollack    MRN: [id number]         : 1931      1055:  Pt arrived ambulatory and in no distress, for lab visit. The patient was asked the following questions and answered as documented below:    1. Do you have any symptoms of COVID-19? SOB, coughing, fever, or generally not feeling well? NO  2. Have you been exposed to COVID-19 recently? NO  3. Have you had any recent contact with family/friend that has a pending COVID test? NO      Follow Up: Proceed with treatment    Labs drawn via LFA, patient tolerated well. Visit Vitals  BP (!) 100/55 (BP 1 Location: Left arm, BP Patient Position: Sitting)   Pulse 72   Temp 96.9 °F (36.1 °C)   Resp 18       1110: Departed [facility] ambulatory and in no distress. Patient scheduled for blood transfusion tomorrow.      Future Appointments   Date Time Provider Jada Angulo   4/15/2021  9:00  [address]       Sussy Umanzor RN  2021

## 2021-04-15 ENCOUNTER — HOSPITAL ENCOUNTER (OUTPATIENT)
Dept: INFUSION THERAPY | Age: 86
Discharge: HOME | End: 2021-04-15
Payer: MEDICARE

## 2021-04-15 VITALS
RESPIRATION RATE: 18 BRPM | HEART RATE: 63 BPM | TEMPERATURE: 97.5 F | SYSTOLIC BLOOD PRESSURE: 140 MMHG | DIASTOLIC BLOOD PRESSURE: 75 MMHG

## 2021-04-15 PROCEDURE — 77030013169 SET IV BLD ICUM -A

## 2021-04-15 PROCEDURE — 74011250637 HC RX REV CODE- 250/637: Performed by: NURSE PRACTITIONER

## 2021-04-15 PROCEDURE — 74011250636 HC RX REV CODE- 250/636: Performed by: INTERNAL MEDICINE

## 2021-04-15 PROCEDURE — 36430 TRANSFUSION BLD/BLD COMPNT: CPT

## 2021-04-15 PROCEDURE — P9016 RBC LEUKOCYTES REDUCED: HCPCS

## 2021-04-15 PROCEDURE — 74011250636 HC RX REV CODE- 250/636: Performed by: NURSE PRACTITIONER

## 2021-04-15 RX ORDER — SODIUM CHLORIDE 9 MG/ML
250 INJECTION, SOLUTION INTRAVENOUS AS NEEDED
Status: DISCONTINUED | OUTPATIENT
Start: 2021-04-15 | End: 2021-04-17 | Stop reason: HOSPADM

## 2021-04-15 RX ADMIN — ACETAMINOPHEN 650 MG: 325 TABLET ORAL at 09:24

## 2021-04-15 RX ADMIN — FUROSEMIDE 20 MG: 10 INJECTION, SOLUTION INTRAMUSCULAR; INTRAVENOUS at 12:01

## 2021-04-15 RX ADMIN — SODIUM CHLORIDE 250 ML: 900 INJECTION, SOLUTION INTRAVENOUS at 09:50

## 2021-04-15 RX ADMIN — DIPHENHYDRAMINE HYDROCHLORIDE 25 MG: 25 CAPSULE ORAL at 09:24

## 2021-04-15 NOTE — PROGRESS NOTES
0905 Pt arrived at [address] [place] [address] in no distress for transfusion of 1 units PRBCs. Assessment completed, no new complaints voiced. IV established in R forearm without difficulty. Signs/symptoms of adverse blood reaction discussed with pt, voiced understanding. Patient Vitals for the past 12 hrs:   Temp Pulse Resp BP   04/15/21 1150 97.5 °F (36.4 °C) 63 18 (!) 140/75   04/15/21 1050 97.5 °F (36.4 °C) 64 18 (!) 152/86   04/15/21 1020 97.5 °F (36.4 °C) 63 18 (!) 140/77   04/15/21 1005 97.7 °F (36.5 °C) 65 18 (!) 142/78   04/15/21 0945 97.2 °F (36.2 °C) 64 18 138/72   04/15/21 0909 97.5 °F (36.4 °C) 66 18 120/65       Medications received:   NS KVO   Tylenol 650 mg PO  Benadryl 25 mg PO  Lasix 20 mg IV    0950: 1st unit PRBCs started and infusing without difficulty, will monitor. 1150: 1st unit completed without adverse reaction noted, NS flushing line. Tolerated transfusion well, no adverse reaction noted. D/C instructions reviewed, copy to pt, voiced understanding. IV flushed and removed. Patient declined 1 hour post transfusion observation. 1205 - D/Cd from [address] [place] [address] in no distress.     Next appt:

## 2021-04-16 LAB
ABO + RH BLD: NORMAL
BLD PROD TYP BPU: NORMAL
BLOOD GROUP ANTIBODIES SERPL: NORMAL
BPU ID: NORMAL
CROSSMATCH RESULT,%XM: NORMAL
SPECIMEN EXP DATE BLD: NORMAL
STATUS OF UNIT,%ST: NORMAL
UNIT DIVISION, %UDIV: 0

## 2021-04-29 ENCOUNTER — PATIENT OUTREACH (OUTPATIENT)
Dept: CASE MANAGEMENT | Age: 86
End: 2021-04-29

## 2021-04-29 NOTE — PROGRESS NOTES
Patient has graduated from the Transitions of Care Coordination  program on 04/29/21  . Patient/family has the ability to self-manage at this time Care management goals have been completed. Patient was not referred to the [facility] team for further management. Goals Addressed                 This Visit's Progress     COMPLETED: Prevent complications post hospitalization. 03/29/21    - Spoke to patients wife, verified on hipaa. - patient will see PCP on 4/2 and Dr. Ryan Briseno on 4/9.   - patient is open to [facility], CTN contacted Steve and confirmed that patient was scheduled to be seen on 3/30.   - wife reports that patient was feeling short of breath Friday, they called EMS, 02 had returned to 95% once they were evaluating patient and patient declined to go to ER at that time so patient stayed home. Family has since obtained a pulse ox and is monitoring 02, it was 95% at time of call and has not been below 90% except one time when they were turning the patient, quickly returned to above 90%. They will keep log to bring to their appt. If it drops below 90% and does not quickly return to above 90% they will contact MD or if it drops below 85% they will contact. - Reminded family to turn patient every 2 hours while in bed, monitor bony prominences, provide pressure relief every hour while sitting down, float heels, and use pillows to relieve pressure. - patient needs follow up CXR in 8-10 weeks. Discussed deep breathing exercises and patient has been performing them. - Reviewed other red flag s/s with patient, nausea, vomiting, inability to pass urine/stool, SOB, mental status change, chest pain, fever.    - discussed low sodium diet now that patient is on lasix. Also discussed daily weights, wife and family will attempt to weigh daily if patient can safely stand on the scale. They will keep log and notify MD/CTN of gain greater then 3 lbs in a day or 5 lbs in a week.  Family will also monitor for edema, SOB and chest pain. - out of service area for dispatch health     Patient will contact Md/CTN if red flag s/s arise. CTN to f/u ~ 7-10 days. AR       04/13/21  - Spoke to patients wife today, verifed on hipaa. Wife reports that patients Hgb was low so he is due for blood transfusion this week. Patient saw an NP in Dr. Mela Ellis office on 4/12 and Hgb was 7.4 (wife is pretty sure). Patient attended appt with Dr. Lisset Carrion on 4/2 and it was 8.9.  - before hgb dropped patient was dong better, he was able to get up and go to the bathroom and be in his chair during the day. Patient is very fatigue she reports from the low hgb. - patient still gets 02 checked, it did drop at one time yesterday at the doctor office into the 80s, it returned to above 90. Patient is still open to [facility]. His fatigue and SOB started occurring on Sunday. She will keep log of 02 and also activity when they check 02.   - other then low Hgb patient was doing well she stated. Patient will contact Md/CTN if red flag s/s arise. CTN to f/u ~ 7-10 days. AR       04/29/21  - Spoke to patients wife, Clifford Chu, verified on hipaa. Patient is now on hospice services. CHERIE period had ended, episode resolved. AR            Patient has Care Transition Nurse's contact information for any further questions, concerns, or needs. Patients upcoming visits:  No future appointments.

## 2022-03-19 PROBLEM — J96.01 ACUTE RESPIRATORY FAILURE WITH HYPOXIA (HCC): Status: ACTIVE | Noted: 2021-03-21

## 2023-05-11 RX ORDER — MIDODRINE HYDROCHLORIDE 10 MG/1
TABLET ORAL 3 TIMES DAILY PRN
COMMUNITY

## 2023-05-11 RX ORDER — FERROUS SULFATE 137(45) MG
TABLET, EXTENDED RELEASE ORAL
COMMUNITY

## 2023-05-11 RX ORDER — FUROSEMIDE 80 MG
TABLET ORAL DAILY
COMMUNITY
Start: 2021-03-26

## (undated) DEVICE — CUFF ADULT 1 PC 1 VINYL DISP --

## (undated) DEVICE — NEEDLE HYPO 18GA L1.5IN PNK S STL HUB POLYPR SHLD REG BVL

## (undated) DEVICE — BLOCK BITE ENDOSCP AD 21 MM W/ DIL BLU LF DISP

## (undated) DEVICE — FORCEPS BX L240CM JAW DIA2.8MM L CAP W/ NDL MIC MESH TOOTH

## (undated) DEVICE — Device

## (undated) DEVICE — Z DISCONTINUED PER MEDLINE LINE GAS SAMPLING O2/CO2 LNG AD 13 FT NSL W/ TBNG FILTERLINE

## (undated) DEVICE — BAG SPEC BIOHZRD 10 X 10 IN --

## (undated) DEVICE — SOLIDIFIER MEDC 1200ML -- CONVERT TO 356117

## (undated) DEVICE — 1200 GUARD II KIT W/5MM TUBE W/O VAC TUBE: Brand: GUARDIAN

## (undated) DEVICE — BASIN EMESIS 500CC ROSE 250/CS 60/PLT: Brand: MEDEGEN MEDICAL PRODUCTS, LLC

## (undated) DEVICE — KENDALL RADIOLUCENT FOAM MONITORING ELECTRODE RECTANGULAR SHAPE: Brand: KENDALL

## (undated) DEVICE — CONTAINER SPEC 20 ML LID NEUT BUFF FORMALIN 10 % POLYPR STS

## (undated) DEVICE — STRAINER URIN CALC RNL MSH -- CONVERT TO ITEM 357634

## (undated) DEVICE — MEDI-VAC YANK SUCT HNDL W/TPRD BULBOUS TIP: Brand: CARDINAL HEALTH

## (undated) DEVICE — SYR 3ML LL TIP 1/10ML GRAD --

## (undated) DEVICE — (D)SYR 10ML 1/5ML GRAD NSAF -- PKGING CHANGE USE ITEM 338027

## (undated) DEVICE — SET ADMIN 16ML TBNG L100IN 2 Y INJ SITE IV PIGGY BK DISP

## (undated) DEVICE — CATH IV AUTOGRD BC PNK 20GA 25 -- INSYTE

## (undated) DEVICE — TRAP SUC MUCOUS 70ML -- MEDICHOICE MEDLINE

## (undated) DEVICE — SNARE ENDOSCP M L240CM W27MM SHTH DIA2.4MM CHN 2.8MM OVL

## (undated) DEVICE — Device: Brand: MEDEX

## (undated) DEVICE — TOWEL 4 PLY TISS 19X30 SUE WHT